# Patient Record
Sex: MALE | Race: BLACK OR AFRICAN AMERICAN | NOT HISPANIC OR LATINO | Employment: FULL TIME | URBAN - METROPOLITAN AREA
[De-identification: names, ages, dates, MRNs, and addresses within clinical notes are randomized per-mention and may not be internally consistent; named-entity substitution may affect disease eponyms.]

---

## 2022-12-16 ENCOUNTER — OFFICE VISIT (OUTPATIENT)
Dept: INTERNAL MEDICINE | Facility: CLINIC | Age: 32
End: 2022-12-16
Payer: COMMERCIAL

## 2022-12-16 VITALS
BODY MASS INDEX: 38.6 KG/M2 | DIASTOLIC BLOOD PRESSURE: 71 MMHG | TEMPERATURE: 97 F | RESPIRATION RATE: 18 BRPM | HEIGHT: 70 IN | WEIGHT: 269.63 LBS | HEART RATE: 92 BPM | OXYGEN SATURATION: 97 % | SYSTOLIC BLOOD PRESSURE: 121 MMHG

## 2022-12-16 DIAGNOSIS — J68.3 REACTIVE AIRWAYS DYSFUNCTION SYNDROME: ICD-10-CM

## 2022-12-16 DIAGNOSIS — E66.09 CLASS 2 OBESITY DUE TO EXCESS CALORIES WITH BODY MASS INDEX (BMI) OF 38.0 TO 38.9 IN ADULT, UNSPECIFIED WHETHER SERIOUS COMORBIDITY PRESENT: ICD-10-CM

## 2022-12-16 DIAGNOSIS — I10 HYPERTENSION, UNSPECIFIED TYPE: ICD-10-CM

## 2022-12-16 DIAGNOSIS — K21.9 GASTROESOPHAGEAL REFLUX DISEASE WITHOUT ESOPHAGITIS: Primary | ICD-10-CM

## 2022-12-16 PROCEDURE — 99214 OFFICE O/P EST MOD 30 MIN: CPT | Mod: S$GLB,,, | Performed by: NURSE PRACTITIONER

## 2022-12-16 PROCEDURE — 99214 PR OFFICE/OUTPT VISIT, EST, LEVL IV, 30-39 MIN: ICD-10-PCS | Mod: S$GLB,,, | Performed by: NURSE PRACTITIONER

## 2022-12-16 PROCEDURE — 3074F PR MOST RECENT SYSTOLIC BLOOD PRESSURE < 130 MM HG: ICD-10-PCS | Mod: CPTII,S$GLB,, | Performed by: NURSE PRACTITIONER

## 2022-12-16 PROCEDURE — 1160F PR REVIEW ALL MEDS BY PRESCRIBER/CLIN PHARMACIST DOCUMENTED: ICD-10-PCS | Mod: CPTII,S$GLB,, | Performed by: NURSE PRACTITIONER

## 2022-12-16 PROCEDURE — 4010F ACE/ARB THERAPY RXD/TAKEN: CPT | Mod: CPTII,S$GLB,, | Performed by: NURSE PRACTITIONER

## 2022-12-16 PROCEDURE — 1160F RVW MEDS BY RX/DR IN RCRD: CPT | Mod: CPTII,S$GLB,, | Performed by: NURSE PRACTITIONER

## 2022-12-16 PROCEDURE — 99999 PR PBB SHADOW E&M-NEW PATIENT-LVL IV: CPT | Mod: PBBFAC,,, | Performed by: NURSE PRACTITIONER

## 2022-12-16 PROCEDURE — 1159F MED LIST DOCD IN RCRD: CPT | Mod: CPTII,S$GLB,, | Performed by: NURSE PRACTITIONER

## 2022-12-16 PROCEDURE — 1159F PR MEDICATION LIST DOCUMENTED IN MEDICAL RECORD: ICD-10-PCS | Mod: CPTII,S$GLB,, | Performed by: NURSE PRACTITIONER

## 2022-12-16 PROCEDURE — 4010F PR ACE/ARB THEARPY RXD/TAKEN: ICD-10-PCS | Mod: CPTII,S$GLB,, | Performed by: NURSE PRACTITIONER

## 2022-12-16 PROCEDURE — 99999 PR PBB SHADOW E&M-NEW PATIENT-LVL IV: ICD-10-PCS | Mod: PBBFAC,,, | Performed by: NURSE PRACTITIONER

## 2022-12-16 PROCEDURE — 3078F DIAST BP <80 MM HG: CPT | Mod: CPTII,S$GLB,, | Performed by: NURSE PRACTITIONER

## 2022-12-16 PROCEDURE — 3074F SYST BP LT 130 MM HG: CPT | Mod: CPTII,S$GLB,, | Performed by: NURSE PRACTITIONER

## 2022-12-16 PROCEDURE — 3008F BODY MASS INDEX DOCD: CPT | Mod: CPTII,S$GLB,, | Performed by: NURSE PRACTITIONER

## 2022-12-16 PROCEDURE — 3008F PR BODY MASS INDEX (BMI) DOCUMENTED: ICD-10-PCS | Mod: CPTII,S$GLB,, | Performed by: NURSE PRACTITIONER

## 2022-12-16 PROCEDURE — 3078F PR MOST RECENT DIASTOLIC BLOOD PRESSURE < 80 MM HG: ICD-10-PCS | Mod: CPTII,S$GLB,, | Performed by: NURSE PRACTITIONER

## 2022-12-16 RX ORDER — BENZONATATE 100 MG/1
100 CAPSULE ORAL 3 TIMES DAILY PRN
Qty: 60 CAPSULE | Refills: 0 | Status: SHIPPED | OUTPATIENT
Start: 2022-12-16 | End: 2022-12-26

## 2022-12-16 RX ORDER — OMEPRAZOLE 20 MG/1
40 CAPSULE, DELAYED RELEASE ORAL DAILY
Qty: 90 CAPSULE | Refills: 0 | Status: SHIPPED | OUTPATIENT
Start: 2022-12-16 | End: 2023-12-16

## 2022-12-16 RX ORDER — AMLODIPINE BESYLATE 5 MG/1
TABLET ORAL
COMMUNITY
End: 2023-02-24 | Stop reason: SDUPTHER

## 2022-12-16 NOTE — PROGRESS NOTES
"  HPI     Chief Complaint  Chief Complaint   Patient presents with    Cough       HPI  Joon Terry is a 32 y.o. male with multiple medical diagnoses as listed in the medical history and problem list that presents for Persistent dry Cough. This patient is new to me.     Persistent Dry Cough x 2 month: Reports a URI towards the end of September 2022. Cough remains consistent throughout the day. Denies nightly coughing. Diet includes burgers, beef, chicken, pizza, pasta, water, green tea, sweet. He usually goes to bed an hour after eating. Does not take "prils" or "tans" for HTN. Takes Amlodipine daily. Vitals are within acceptable in clinic. No hx of Asthma, Bronchitis or autoimmunize disease.     History     PAST MEDICAL HISTORY:  Past Medical History:   Diagnosis Date    Hypertension        PAST SURGICAL HISTORY:  History reviewed. No pertinent surgical history.    SOCIAL HISTORY:  Social History     Socioeconomic History    Marital status:        FAMILY HISTORY:  Family History   Problem Relation Age of Onset    Hypertension Mother     Diabetes Father        ALLERGIES AND MEDICATIONS: updated and reviewed.  Review of patient's allergies indicates:  No Known Allergies  Current Outpatient Medications   Medication Sig Dispense Refill    amLODIPine (NORVASC) 5 MG tablet amlodipine Take tablet (oral) 1 time per day No date recorded tablet 1 time per day oral No set duration recorded No set duration amount recorded active 5 mg      (Magic mouthwash) 1:1:1 diphenhydrAMINE(Benadryl) 12.5mg/5ml liq, aluminum & magnesium hydroxide-simethicone (Maalox), LIDOcaine viscous 2% Swish and spit 5 mLs every 4 (four) hours as needed. for mouth sores 360 mL 0    benzonatate (TESSALON) 100 MG capsule Take 1 capsule (100 mg total) by mouth 3 (three) times daily as needed for Cough. 60 capsule 0    omeprazole (PRILOSEC) 20 MG capsule Take 2 capsules (40 mg total) by mouth once daily. 90 capsule 0     No current " "facility-administered medications for this visit.       Exam     ROS  Review of Systems   Constitutional:  Negative for appetite change, chills, fatigue and fever.   HENT:  Negative for congestion, ear pain, postnasal drip, rhinorrhea, sneezing, sore throat and tinnitus.    Respiratory:  Positive for cough. Negative for shortness of breath.    Cardiovascular:  Negative for chest pain and palpitations.   Gastrointestinal:  Negative for abdominal pain, constipation, diarrhea, nausea and vomiting.   Genitourinary:  Negative for difficulty urinating and dysuria.   Musculoskeletal:  Negative for arthralgias.   Neurological:  Negative for headaches.   Psychiatric/Behavioral:  Negative for sleep disturbance.          Physical Exam  Vitals:    12/16/22 0931   BP: 121/71   BP Location: Right arm   Patient Position: Sitting   BP Method: Large (Manual)   Pulse: 92   Resp: 18   Temp: 97.2 °F (36.2 °C)   TempSrc: Tympanic   SpO2: 97%   Weight: 122.3 kg (269 lb 10 oz)   Height: 5' 10" (1.778 m)    Body mass index is 38.69 kg/m².  Weight: 122.3 kg (269 lb 10 oz)   Height: 5' 10" (177.8 cm)   Physical Exam  Constitutional:       General: He is not in acute distress.     Appearance: He is well-developed.   HENT:      Head: Normocephalic and atraumatic.      Right Ear: External ear normal.      Left Ear: External ear normal.      Nose: Nose normal.      Mouth/Throat:      Pharynx: No oropharyngeal exudate.   Eyes:      Pupils: Pupils are equal, round, and reactive to light.   Cardiovascular:      Rate and Rhythm: Normal rate and regular rhythm.      Heart sounds: No murmur heard.    No friction rub. No gallop.   Pulmonary:      Effort: Pulmonary effort is normal. No respiratory distress.      Breath sounds: Normal breath sounds. No wheezing.   Abdominal:      General: Bowel sounds are normal.      Palpations: Abdomen is soft.   Musculoskeletal:      Cervical back: Neck supple.   Lymphadenopathy:      Cervical: No cervical adenopathy. "   Skin:     General: Skin is warm and dry.   Neurological:      Mental Status: He is alert and oriented to person, place, and time.   Psychiatric:         Mood and Affect: Mood normal.         Health Maintenance         Date Due Completion Date    Hepatitis C Screening Never done ---    Lipid Panel Never done ---    COVID-19 Vaccine (1) Never done ---    HIV Screening Never done ---    TETANUS VACCINE Never done ---    Influenza Vaccine (1) Never done ---            Assessment & Plan     Assessment & Plan  Problem List Items Addressed This Visit    None  Visit Diagnoses       Gastroesophageal reflux disease without esophagitis    -  Primary  -We discussed effective administration of Prilosec, adverse effects and side effects with education given for reinforcement  - Advised to gargle 5-10 ml of Mag mouthwash every 4-6 hours as needed   -Patient advised not to eat or drink at least 2-3 hours before bed. Decrease spicy and fatty meals. Increase watre intake. Use NSAIDS sparingly. Sleep with head elevated on pillows (Do not sleep flat)    Relevant Medications    omeprazole (PRILOSEC) 20 MG capsule    (Magic mouthwash) 1:1:1 diphenhydrAMINE(Benadryl) 12.5mg/5ml liq, aluminum & magnesium hydroxide-simethicone (Maalox), LIDOcaine viscous 2%    Reactive airways dysfunction syndrome      -We discussed effective administration of Tessalon Perles, adverse effects and side effects with education given for reinforcement    Relevant Medications    benzonatate (TESSALON) 100 MG capsule    Hypertension, unspecified type      -The current medical regimen is effective;  continue present plan and medications.   -Labs for scheduled routine physical exam    Relevant Orders    CBC Auto Differential    Comprehensive Metabolic Panel    TSH    Urinalysis    Class 2 obesity due to excess calories with body mass index (BMI) of 38.0 to 38.9 in adult, unspecified whether serious comorbidity present      -Diet and exercise discussed with  "patient.   -We discussed a "heart-healthy" diet with lots of fruits and vegetables, fiber, and healthy fats (like those found in fish and certain oils). Limit sugar, red meats, unhealthy fats and late night snacking.    Relevant Orders    Lipid Panel    Hemoglobin A1C              Health Maintenance reviewed: Deferred per patient    Follow-up: 1 month for Routine physical exam    30+ minutes of total time spent on the encounter, which includes face to face time and non-face to face time preparing to see the patient (eg, review of tests), Obtaining and/or reviewing separately obtained history, documenting clinical information in the electronic or other health record, independently interpreting results (not separately reported) and communicating results to the patient/family/caregiver, or Care coordination (not separately reported).    "

## 2023-01-18 ENCOUNTER — LAB VISIT (OUTPATIENT)
Dept: LAB | Facility: HOSPITAL | Age: 33
End: 2023-01-18
Attending: NURSE PRACTITIONER
Payer: COMMERCIAL

## 2023-01-18 DIAGNOSIS — I10 HYPERTENSION, UNSPECIFIED TYPE: ICD-10-CM

## 2023-01-18 DIAGNOSIS — E66.09 CLASS 2 OBESITY DUE TO EXCESS CALORIES WITH BODY MASS INDEX (BMI) OF 38.0 TO 38.9 IN ADULT, UNSPECIFIED WHETHER SERIOUS COMORBIDITY PRESENT: ICD-10-CM

## 2023-01-18 LAB
ALBUMIN SERPL BCP-MCNC: 3.7 G/DL (ref 3.5–5.2)
ALP SERPL-CCNC: 63 U/L (ref 55–135)
ALT SERPL W/O P-5'-P-CCNC: 29 U/L (ref 10–44)
ANION GAP SERPL CALC-SCNC: 11 MMOL/L (ref 8–16)
AST SERPL-CCNC: 43 U/L (ref 10–40)
BASOPHILS # BLD AUTO: 0.01 K/UL (ref 0–0.2)
BASOPHILS NFR BLD: 0.2 % (ref 0–1.9)
BILIRUB SERPL-MCNC: 0.4 MG/DL (ref 0.1–1)
BUN SERPL-MCNC: 10 MG/DL (ref 6–20)
CALCIUM SERPL-MCNC: 9.3 MG/DL (ref 8.7–10.5)
CHLORIDE SERPL-SCNC: 105 MMOL/L (ref 95–110)
CHOLEST SERPL-MCNC: 137 MG/DL (ref 120–199)
CHOLEST/HDLC SERPL: 5.7 {RATIO} (ref 2–5)
CO2 SERPL-SCNC: 23 MMOL/L (ref 23–29)
CREAT SERPL-MCNC: 0.9 MG/DL (ref 0.5–1.4)
DIFFERENTIAL METHOD: ABNORMAL
EOSINOPHIL # BLD AUTO: 0 K/UL (ref 0–0.5)
EOSINOPHIL NFR BLD: 0 % (ref 0–8)
ERYTHROCYTE [DISTWIDTH] IN BLOOD BY AUTOMATED COUNT: 14.5 % (ref 11.5–14.5)
EST. GFR  (NO RACE VARIABLE): >60 ML/MIN/1.73 M^2
ESTIMATED AVG GLUCOSE: 111 MG/DL (ref 68–131)
GLUCOSE SERPL-MCNC: 102 MG/DL (ref 70–110)
HBA1C MFR BLD: 5.5 % (ref 4–5.6)
HCT VFR BLD AUTO: 37 % (ref 40–54)
HDLC SERPL-MCNC: 24 MG/DL (ref 40–75)
HDLC SERPL: 17.5 % (ref 20–50)
HGB BLD-MCNC: 11.8 G/DL (ref 14–18)
IMM GRANULOCYTES # BLD AUTO: 0.04 K/UL (ref 0–0.04)
IMM GRANULOCYTES NFR BLD AUTO: 0.9 % (ref 0–0.5)
LDLC SERPL CALC-MCNC: 80 MG/DL (ref 63–159)
LYMPHOCYTES # BLD AUTO: 1.1 K/UL (ref 1–4.8)
LYMPHOCYTES NFR BLD: 23.5 % (ref 18–48)
MCH RBC QN AUTO: 28.2 PG (ref 27–31)
MCHC RBC AUTO-ENTMCNC: 31.9 G/DL (ref 32–36)
MCV RBC AUTO: 89 FL (ref 82–98)
MONOCYTES # BLD AUTO: 0.4 K/UL (ref 0.3–1)
MONOCYTES NFR BLD: 8.3 % (ref 4–15)
NEUTROPHILS # BLD AUTO: 3.1 K/UL (ref 1.8–7.7)
NEUTROPHILS NFR BLD: 67.1 % (ref 38–73)
NONHDLC SERPL-MCNC: 113 MG/DL
NRBC BLD-RTO: 0 /100 WBC
PLATELET # BLD AUTO: 150 K/UL (ref 150–450)
PMV BLD AUTO: 11.8 FL (ref 9.2–12.9)
POTASSIUM SERPL-SCNC: 3.5 MMOL/L (ref 3.5–5.1)
PROT SERPL-MCNC: 7.6 G/DL (ref 6–8.4)
RBC # BLD AUTO: 4.18 M/UL (ref 4.6–6.2)
SODIUM SERPL-SCNC: 139 MMOL/L (ref 136–145)
TRIGL SERPL-MCNC: 165 MG/DL (ref 30–150)
TSH SERPL DL<=0.005 MIU/L-ACNC: 1.59 UIU/ML (ref 0.4–4)
WBC # BLD AUTO: 4.68 K/UL (ref 3.9–12.7)

## 2023-01-18 PROCEDURE — 80053 COMPREHEN METABOLIC PANEL: CPT | Performed by: NURSE PRACTITIONER

## 2023-01-18 PROCEDURE — 84443 ASSAY THYROID STIM HORMONE: CPT | Performed by: NURSE PRACTITIONER

## 2023-01-18 PROCEDURE — 36415 COLL VENOUS BLD VENIPUNCTURE: CPT | Performed by: NURSE PRACTITIONER

## 2023-01-18 PROCEDURE — 83036 HEMOGLOBIN GLYCOSYLATED A1C: CPT | Performed by: NURSE PRACTITIONER

## 2023-01-18 PROCEDURE — 85025 COMPLETE CBC W/AUTO DIFF WBC: CPT | Performed by: NURSE PRACTITIONER

## 2023-01-18 PROCEDURE — 80061 LIPID PANEL: CPT | Performed by: NURSE PRACTITIONER

## 2023-01-27 ENCOUNTER — OFFICE VISIT (OUTPATIENT)
Dept: INTERNAL MEDICINE | Facility: CLINIC | Age: 33
End: 2023-01-27
Payer: COMMERCIAL

## 2023-01-27 VITALS
SYSTOLIC BLOOD PRESSURE: 132 MMHG | DIASTOLIC BLOOD PRESSURE: 78 MMHG | RESPIRATION RATE: 16 BRPM | HEIGHT: 70 IN | HEART RATE: 98 BPM | OXYGEN SATURATION: 97 % | BODY MASS INDEX: 39.04 KG/M2 | WEIGHT: 272.69 LBS | TEMPERATURE: 100 F

## 2023-01-27 DIAGNOSIS — E66.09 CLASS 2 OBESITY DUE TO EXCESS CALORIES WITH BODY MASS INDEX (BMI) OF 39.0 TO 39.9 IN ADULT, UNSPECIFIED WHETHER SERIOUS COMORBIDITY PRESENT: ICD-10-CM

## 2023-01-27 DIAGNOSIS — Z00.00 ROUTINE PHYSICAL EXAMINATION: Primary | ICD-10-CM

## 2023-01-27 DIAGNOSIS — E78.5 DYSLIPIDEMIA: ICD-10-CM

## 2023-01-27 DIAGNOSIS — J39.2 THROAT IRRITATION: ICD-10-CM

## 2023-01-27 DIAGNOSIS — J30.9 ALLERGIC RHINITIS, UNSPECIFIED SEASONALITY, UNSPECIFIED TRIGGER: ICD-10-CM

## 2023-01-27 PROBLEM — E66.812 CLASS 2 OBESITY DUE TO EXCESS CALORIES WITH BODY MASS INDEX (BMI) OF 39.0 TO 39.9 IN ADULT: Status: ACTIVE | Noted: 2023-01-27

## 2023-01-27 PROCEDURE — 3078F DIAST BP <80 MM HG: CPT | Mod: CPTII,S$GLB,, | Performed by: NURSE PRACTITIONER

## 2023-01-27 PROCEDURE — 3044F PR MOST RECENT HEMOGLOBIN A1C LEVEL <7.0%: ICD-10-PCS | Mod: CPTII,S$GLB,, | Performed by: NURSE PRACTITIONER

## 2023-01-27 PROCEDURE — 3075F PR MOST RECENT SYSTOLIC BLOOD PRESS GE 130-139MM HG: ICD-10-PCS | Mod: CPTII,S$GLB,, | Performed by: NURSE PRACTITIONER

## 2023-01-27 PROCEDURE — 3044F HG A1C LEVEL LT 7.0%: CPT | Mod: CPTII,S$GLB,, | Performed by: NURSE PRACTITIONER

## 2023-01-27 PROCEDURE — 1159F PR MEDICATION LIST DOCUMENTED IN MEDICAL RECORD: ICD-10-PCS | Mod: CPTII,S$GLB,, | Performed by: NURSE PRACTITIONER

## 2023-01-27 PROCEDURE — 1159F MED LIST DOCD IN RCRD: CPT | Mod: CPTII,S$GLB,, | Performed by: NURSE PRACTITIONER

## 2023-01-27 PROCEDURE — 3078F PR MOST RECENT DIASTOLIC BLOOD PRESSURE < 80 MM HG: ICD-10-PCS | Mod: CPTII,S$GLB,, | Performed by: NURSE PRACTITIONER

## 2023-01-27 PROCEDURE — 3008F BODY MASS INDEX DOCD: CPT | Mod: CPTII,S$GLB,, | Performed by: NURSE PRACTITIONER

## 2023-01-27 PROCEDURE — 99214 OFFICE O/P EST MOD 30 MIN: CPT | Mod: S$GLB,,, | Performed by: NURSE PRACTITIONER

## 2023-01-27 PROCEDURE — 99999 PR PBB SHADOW E&M-EST. PATIENT-LVL IV: ICD-10-PCS | Mod: PBBFAC,,, | Performed by: NURSE PRACTITIONER

## 2023-01-27 PROCEDURE — 99999 PR PBB SHADOW E&M-EST. PATIENT-LVL IV: CPT | Mod: PBBFAC,,, | Performed by: NURSE PRACTITIONER

## 2023-01-27 PROCEDURE — 3008F PR BODY MASS INDEX (BMI) DOCUMENTED: ICD-10-PCS | Mod: CPTII,S$GLB,, | Performed by: NURSE PRACTITIONER

## 2023-01-27 PROCEDURE — 3075F SYST BP GE 130 - 139MM HG: CPT | Mod: CPTII,S$GLB,, | Performed by: NURSE PRACTITIONER

## 2023-01-27 PROCEDURE — 1160F RVW MEDS BY RX/DR IN RCRD: CPT | Mod: CPTII,S$GLB,, | Performed by: NURSE PRACTITIONER

## 2023-01-27 PROCEDURE — 1160F PR REVIEW ALL MEDS BY PRESCRIBER/CLIN PHARMACIST DOCUMENTED: ICD-10-PCS | Mod: CPTII,S$GLB,, | Performed by: NURSE PRACTITIONER

## 2023-01-27 PROCEDURE — 99214 PR OFFICE/OUTPT VISIT, EST, LEVL IV, 30-39 MIN: ICD-10-PCS | Mod: S$GLB,,, | Performed by: NURSE PRACTITIONER

## 2023-01-27 RX ORDER — AZELASTINE 1 MG/ML
1 SPRAY, METERED NASAL 2 TIMES DAILY
Qty: 90 ML | Refills: 0 | Status: SHIPPED | OUTPATIENT
Start: 2023-01-27 | End: 2024-01-27

## 2023-01-27 NOTE — PROGRESS NOTES
HPI     Chief Complaint  No chief complaint on file.      HPI  Joon Terry is a 32 y.o. male with multiple medical diagnoses as listed in the medical history and problem list that presents for Routine Physical Exam. Pt is known to me with his last appointment 12/16/2022.      Routine Physical Exam: Reviewed labs with patient, discussed diet modifications and concerns. Treated at last visit for GERD. Reports throat irritation remains with increased mucous production. Denies dysphagia, hemoptysis, throat swelling or difficulty breathing. Vitals are within acceptable limits today.     History     PAST MEDICAL HISTORY:  Past Medical History:   Diagnosis Date    Hypertension        PAST SURGICAL HISTORY:  History reviewed. No pertinent surgical history.    SOCIAL HISTORY:  Social History     Socioeconomic History    Marital status:        FAMILY HISTORY:  Family History   Problem Relation Age of Onset    Hypertension Mother     Diabetes Father        ALLERGIES AND MEDICATIONS: updated and reviewed.  Review of patient's allergies indicates:  No Known Allergies  Current Outpatient Medications   Medication Sig Dispense Refill    (Magic mouthwash) 1:1:1 diphenhydrAMINE(Benadryl) 12.5mg/5ml liq, aluminum & magnesium hydroxide-simethicone (Maalox), LIDOcaine viscous 2% Swish and spit 5 mLs every 4 (four) hours as needed. for mouth sores 360 mL 0    amLODIPine (NORVASC) 5 MG tablet amlodipine Take tablet (oral) 1 time per day No date recorded tablet 1 time per day oral No set duration recorded No set duration amount recorded active 5 mg      omeprazole (PRILOSEC) 20 MG capsule Take 2 capsules (40 mg total) by mouth once daily. 90 capsule 0    azelastine (ASTELIN) 137 mcg (0.1 %) nasal spray 1 spray (137 mcg total) by Nasal route 2 (two) times daily. 90 mL 0     No current facility-administered medications for this visit.       Exam     ROS  Review of Systems   Constitutional:  Negative for appetite change, chills,  "fatigue and fever.   HENT:  Positive for sore throat. Negative for congestion, ear pain, postnasal drip, rhinorrhea, sneezing and tinnitus.    Respiratory:  Negative for cough and shortness of breath.    Cardiovascular:  Negative for chest pain and palpitations.   Gastrointestinal:  Negative for abdominal pain, constipation, diarrhea, nausea and vomiting.   Genitourinary:  Negative for difficulty urinating and dysuria.   Musculoskeletal:  Negative for arthralgias.   Neurological:  Negative for headaches.   Psychiatric/Behavioral:  Negative for sleep disturbance.          Physical Exam  Vitals:    01/27/23 1341   BP: 132/78   BP Location: Left arm   Patient Position: Sitting   BP Method: Large (Manual)   Pulse: 98   Resp: 16   Temp: 100 °F (37.8 °C)   TempSrc: Tympanic   SpO2: 97%   Weight: 123.7 kg (272 lb 11.3 oz)   Height: 5' 10" (1.778 m)    Body mass index is 39.13 kg/m².  Weight: 123.7 kg (272 lb 11.3 oz)   Height: 5' 10" (177.8 cm)   Physical Exam  Constitutional:       General: He is not in acute distress.     Appearance: Normal appearance. He is well-developed. He is obese.   HENT:      Head: Normocephalic and atraumatic.      Right Ear: External ear normal.      Left Ear: External ear normal.      Nose: Nose normal.      Mouth/Throat:      Pharynx: No oropharyngeal exudate.   Eyes:      Pupils: Pupils are equal, round, and reactive to light.   Cardiovascular:      Rate and Rhythm: Normal rate and regular rhythm.      Pulses: Normal pulses.      Heart sounds: No murmur heard.    No friction rub. No gallop.   Pulmonary:      Effort: Pulmonary effort is normal. No respiratory distress.      Breath sounds: Normal breath sounds. No wheezing.   Abdominal:      General: Bowel sounds are normal.      Palpations: Abdomen is soft.   Musculoskeletal:      Cervical back: Neck supple.   Lymphadenopathy:      Cervical: No cervical adenopathy.   Skin:     General: Skin is warm and dry.      Capillary Refill: Capillary " refill takes less than 2 seconds.   Neurological:      General: No focal deficit present.      Mental Status: He is alert and oriented to person, place, and time. Mental status is at baseline.   Psychiatric:         Mood and Affect: Mood normal.         Health Maintenance         Date Due Completion Date    Hepatitis C Screening Never done ---    COVID-19 Vaccine (1) Never done ---    HIV Screening Never done ---    TETANUS VACCINE Never done ---    Influenza Vaccine (1) Never done ---            Assessment & Plan     Assessment & Plan  Problem List Items Addressed This Visit          ENT    Allergic rhinitis  -We discussed effective administration of Astelin, adverse effects and side effects with education given for reinforcement    Relevant Medications    azelastine (ASTELIN) 137 mcg (0.1 %) nasal spray       Cardiac/Vascular    Dyslipidemia   -Diet controlled at this time  -May benefit from statin therapy if no change in approx 5 months       Endocrine    Class 2 obesity due to excess calories with body mass index (BMI) of 39.0 to 39.9 in adult  -Diet and exercise discussed with patient.      Other Visit Diagnoses       Throat irritation        Relevant Orders    Ambulatory referral/consult to ENT    Routine physical examination    - Primary  -Counseled on age appropriate medical preventative services including age appropriate cancer screenings, age appropriate eye and dental exams, over all nutritional health, need for a consistent exercise regimen, and an over all push towards maintaining a vigorous and active lifestyle.  Counseled on age appropriate vaccines and discussed upcoming health care needs based on age/gender. Discussed good sleep hygiene and stress management.              Health Maintenance reviewed: Deferred per patient     Follow-up: 5 months to Establish Care with Dr. Andujar    30+ minutes of total time spent on the encounter, which includes face to face time and non-face to face time preparing  to see the patient (eg, review of tests), Obtaining and/or reviewing separately obtained history, documenting clinical information in the electronic or other health record, independently interpreting results (not separately reported) and communicating results to the patient/family/caregiver, or Care coordination (not separately reported).

## 2023-01-30 ENCOUNTER — HOSPITAL ENCOUNTER (OUTPATIENT)
Dept: RADIOLOGY | Facility: HOSPITAL | Age: 33
Discharge: HOME | End: 2023-01-30
Attending: OTOLARYNGOLOGY
Payer: COMMERCIAL

## 2023-01-30 ENCOUNTER — OFFICE VISIT (OUTPATIENT)
Dept: OTOLARYNGOLOGY | Facility: CLINIC | Age: 33
End: 2023-01-30
Payer: COMMERCIAL

## 2023-01-30 ENCOUNTER — TELEPHONE (OUTPATIENT)
Dept: OTOLARYNGOLOGY | Facility: CLINIC | Age: 33
End: 2023-01-30

## 2023-01-30 ENCOUNTER — PATIENT MESSAGE (OUTPATIENT)
Dept: PULMONOLOGY | Facility: CLINIC | Age: 33
End: 2023-01-30
Payer: COMMERCIAL

## 2023-01-30 VITALS
HEART RATE: 96 BPM | BODY MASS INDEX: 39.04 KG/M2 | HEIGHT: 70 IN | TEMPERATURE: 98 F | DIASTOLIC BLOOD PRESSURE: 105 MMHG | WEIGHT: 272.69 LBS | SYSTOLIC BLOOD PRESSURE: 151 MMHG

## 2023-01-30 DIAGNOSIS — R05.9 COUGH, UNSPECIFIED TYPE: ICD-10-CM

## 2023-01-30 DIAGNOSIS — R05.9 COUGH, UNSPECIFIED TYPE: Primary | ICD-10-CM

## 2023-01-30 PROCEDURE — 1160F PR REVIEW ALL MEDS BY PRESCRIBER/CLIN PHARMACIST DOCUMENTED: ICD-10-PCS | Mod: CPTII,S$GLB,, | Performed by: OTOLARYNGOLOGY

## 2023-01-30 PROCEDURE — 70220 X-RAY EXAM OF SINUSES: CPT | Mod: TC

## 2023-01-30 PROCEDURE — 1160F RVW MEDS BY RX/DR IN RCRD: CPT | Mod: CPTII,S$GLB,, | Performed by: OTOLARYNGOLOGY

## 2023-01-30 PROCEDURE — 70220 X-RAY EXAM OF SINUSES: CPT | Mod: 26,,, | Performed by: RADIOLOGY

## 2023-01-30 PROCEDURE — 3044F PR MOST RECENT HEMOGLOBIN A1C LEVEL <7.0%: ICD-10-PCS | Mod: CPTII,S$GLB,, | Performed by: OTOLARYNGOLOGY

## 2023-01-30 PROCEDURE — 1159F MED LIST DOCD IN RCRD: CPT | Mod: CPTII,S$GLB,, | Performed by: OTOLARYNGOLOGY

## 2023-01-30 PROCEDURE — 99202 PR OFFICE/OUTPT VISIT, NEW, LEVL II, 15-29 MIN: ICD-10-PCS | Mod: S$GLB,,, | Performed by: OTOLARYNGOLOGY

## 2023-01-30 PROCEDURE — 1159F PR MEDICATION LIST DOCUMENTED IN MEDICAL RECORD: ICD-10-PCS | Mod: CPTII,S$GLB,, | Performed by: OTOLARYNGOLOGY

## 2023-01-30 PROCEDURE — 3008F PR BODY MASS INDEX (BMI) DOCUMENTED: ICD-10-PCS | Mod: CPTII,S$GLB,, | Performed by: OTOLARYNGOLOGY

## 2023-01-30 PROCEDURE — 99999 PR PBB SHADOW E&M-EST. PATIENT-LVL IV: CPT | Mod: PBBFAC,,, | Performed by: OTOLARYNGOLOGY

## 2023-01-30 PROCEDURE — 3077F PR MOST RECENT SYSTOLIC BLOOD PRESSURE >= 140 MM HG: ICD-10-PCS | Mod: CPTII,S$GLB,, | Performed by: OTOLARYNGOLOGY

## 2023-01-30 PROCEDURE — 99999 PR PBB SHADOW E&M-EST. PATIENT-LVL IV: ICD-10-PCS | Mod: PBBFAC,,, | Performed by: OTOLARYNGOLOGY

## 2023-01-30 PROCEDURE — 3080F PR MOST RECENT DIASTOLIC BLOOD PRESSURE >= 90 MM HG: ICD-10-PCS | Mod: CPTII,S$GLB,, | Performed by: OTOLARYNGOLOGY

## 2023-01-30 PROCEDURE — 99202 OFFICE O/P NEW SF 15 MIN: CPT | Mod: S$GLB,,, | Performed by: OTOLARYNGOLOGY

## 2023-01-30 PROCEDURE — 3008F BODY MASS INDEX DOCD: CPT | Mod: CPTII,S$GLB,, | Performed by: OTOLARYNGOLOGY

## 2023-01-30 PROCEDURE — 3080F DIAST BP >= 90 MM HG: CPT | Mod: CPTII,S$GLB,, | Performed by: OTOLARYNGOLOGY

## 2023-01-30 PROCEDURE — 3044F HG A1C LEVEL LT 7.0%: CPT | Mod: CPTII,S$GLB,, | Performed by: OTOLARYNGOLOGY

## 2023-01-30 PROCEDURE — 70220 XR SINUSES MIN 3 VIEWS: ICD-10-PCS | Mod: 26,,, | Performed by: RADIOLOGY

## 2023-01-30 PROCEDURE — 3077F SYST BP >= 140 MM HG: CPT | Mod: CPTII,S$GLB,, | Performed by: OTOLARYNGOLOGY

## 2023-01-30 NOTE — PROGRESS NOTES
SUBJECTIVE:    HPI: James is here today because he has had a persistent cough and mucous since having a URI in September. He denies throat pain, but he feels that his throat is irritated, which causes him to cough. He has tried prescription and OTC cough meds without relief. He does not think the cough is worse during a particular time of the day.He has been on Omeprazole, which has not helped. Tessalon perles have not helped. He has not seen Pulmonary. He has not had a chest xray. No aggravating or alleviating factors. He denies allergic symptoms. He has a good bit of post nasal drip but no facial pressure. He does have a prescription for Flonase but hasn't used it yet.         OBJECTIVE:  Physical Exam  Constitutional:       Appearance: Normal appearance.   HENT:      Right Ear: External ear normal.      Left Ear: External ear normal.      Nose: Nose normal. No mucosal edema or rhinorrhea.      Mouth/Throat:      Mouth: Mucous membranes are moist.      Pharynx: Oropharynx is clear.   Pulmonary:      Effort: Pulmonary effort is normal.   Musculoskeletal:      Cervical back: Normal range of motion.   Neurological:      Mental Status: He is alert and oriented to person, place, and time.   Psychiatric:         Mood and Affect: Mood normal.         Behavior: Behavior normal.         ASSESSMENT:    Encounter Diagnosis   Name Primary?    Cough, unspecified type          PLAN:       We discussed this could be a post inflammatory cough. It could also be related to occult sinusitis (allergic rhinitis and reflux less likely). Will proceed with sinus xray for further evaluation. He declined fiberoptic laryngoscopy today. If sinus xray is clear, will refer to Pulmonary for further evaluation.

## 2023-02-10 PROBLEM — R05.3 CHRONIC COUGH: Status: ACTIVE | Noted: 2023-02-10

## 2023-02-24 ENCOUNTER — HOSPITAL ENCOUNTER (OUTPATIENT)
Dept: RADIOLOGY | Facility: HOSPITAL | Age: 33
Discharge: HOME | End: 2023-02-24
Attending: NURSE PRACTITIONER
Payer: COMMERCIAL

## 2023-02-24 ENCOUNTER — OFFICE VISIT (OUTPATIENT)
Dept: INTERNAL MEDICINE | Facility: CLINIC | Age: 33
End: 2023-02-24
Payer: COMMERCIAL

## 2023-02-24 VITALS
BODY MASS INDEX: 39.01 KG/M2 | DIASTOLIC BLOOD PRESSURE: 100 MMHG | SYSTOLIC BLOOD PRESSURE: 128 MMHG | OXYGEN SATURATION: 98 % | WEIGHT: 272.5 LBS | TEMPERATURE: 102 F | HEIGHT: 70 IN | HEART RATE: 110 BPM

## 2023-02-24 DIAGNOSIS — R50.9 FEVER, UNSPECIFIED FEVER CAUSE: ICD-10-CM

## 2023-02-24 DIAGNOSIS — J40 BRONCHITIS: ICD-10-CM

## 2023-02-24 DIAGNOSIS — R50.9 FEVER, UNSPECIFIED FEVER CAUSE: Primary | ICD-10-CM

## 2023-02-24 DIAGNOSIS — R05.3 CHRONIC COUGH: ICD-10-CM

## 2023-02-24 PROBLEM — I10 HYPERTENSION: Status: ACTIVE | Noted: 2023-02-24

## 2023-02-24 LAB
CTP QC/QA: YES
CTP QC/QA: YES
POC MOLECULAR INFLUENZA A AGN: NEGATIVE
POC MOLECULAR INFLUENZA B AGN: NEGATIVE
SARS-COV-2 RDRP RESP QL NAA+PROBE: NEGATIVE

## 2023-02-24 PROCEDURE — 99999 PR PBB SHADOW E&M-EST. PATIENT-LVL IV: ICD-10-PCS | Mod: PBBFAC,,, | Performed by: NURSE PRACTITIONER

## 2023-02-24 PROCEDURE — 99214 OFFICE O/P EST MOD 30 MIN: CPT | Mod: S$GLB,,, | Performed by: NURSE PRACTITIONER

## 2023-02-24 PROCEDURE — 87635 PR SARS-COV-2 COVID-19 AMPLIFIED PROBE: ICD-10-PCS | Mod: QW,S$GLB,, | Performed by: NURSE PRACTITIONER

## 2023-02-24 PROCEDURE — 99214 PR OFFICE/OUTPT VISIT, EST, LEVL IV, 30-39 MIN: ICD-10-PCS | Mod: S$GLB,,, | Performed by: NURSE PRACTITIONER

## 2023-02-24 PROCEDURE — 71046 X-RAY EXAM CHEST 2 VIEWS: CPT | Mod: 26,,, | Performed by: STUDENT IN AN ORGANIZED HEALTH CARE EDUCATION/TRAINING PROGRAM

## 2023-02-24 PROCEDURE — 87635 SARS-COV-2 COVID-19 AMP PRB: CPT | Mod: PBBFAC | Performed by: NURSE PRACTITIONER

## 2023-02-24 PROCEDURE — 87502 INFLUENZA DNA AMP PROBE: CPT | Mod: QW,S$GLB,, | Performed by: NURSE PRACTITIONER

## 2023-02-24 PROCEDURE — 87635 SARS-COV-2 COVID-19 AMP PRB: CPT | Mod: QW,S$GLB,, | Performed by: NURSE PRACTITIONER

## 2023-02-24 PROCEDURE — 71046 XR CHEST PA AND LATERAL: ICD-10-PCS | Mod: 26,,, | Performed by: STUDENT IN AN ORGANIZED HEALTH CARE EDUCATION/TRAINING PROGRAM

## 2023-02-24 PROCEDURE — 87502 INFLUENZA DNA AMP PROBE: CPT | Mod: PBBFAC | Performed by: NURSE PRACTITIONER

## 2023-02-24 PROCEDURE — 71046 X-RAY EXAM CHEST 2 VIEWS: CPT | Mod: TC

## 2023-02-24 PROCEDURE — 87502 CHG INFECTIOUS AGENT DNA/RNA INFLUENZA FIRST 2 TYPES: ICD-10-PCS | Mod: QW,S$GLB,, | Performed by: NURSE PRACTITIONER

## 2023-02-24 PROCEDURE — 99214 OFFICE O/P EST MOD 30 MIN: CPT | Mod: PBBFAC,25 | Performed by: NURSE PRACTITIONER

## 2023-02-24 PROCEDURE — 99999 PR PBB SHADOW E&M-EST. PATIENT-LVL IV: CPT | Mod: PBBFAC,,, | Performed by: NURSE PRACTITIONER

## 2023-02-24 RX ORDER — AMLODIPINE BESYLATE 5 MG/1
TABLET ORAL
Qty: 90 TABLET | Refills: 1 | Status: SHIPPED | OUTPATIENT
Start: 2023-02-24

## 2023-02-24 RX ORDER — DOXYCYCLINE 100 MG/1
100 CAPSULE ORAL EVERY 12 HOURS
Qty: 20 CAPSULE | Refills: 0 | Status: SHIPPED | OUTPATIENT
Start: 2023-02-24 | End: 2023-03-10 | Stop reason: SDUPTHER

## 2023-02-24 NOTE — PROGRESS NOTES
Subjective:       Patient ID: Joon Terry is a 32 y.o. male.    Chief Complaint: Cough, Fever, mucus , and Nasal Congestion    Cough  Associated symptoms include a fever. Pertinent negatives include no chest pain, chills, ear pain, headaches, postnasal drip, rhinorrhea, sore throat, shortness of breath or wheezing.   Fever   Associated symptoms include congestion and coughing. Pertinent negatives include no abdominal pain, chest pain, diarrhea, ear pain, headaches, nausea, sore throat, urinary pain, vomiting or wheezing.     Pt here for above  Ongoing chronic cough x a few months. Saw ent, has nasal spray, PPI- not improved  Now with fever that began yesterday. No cp , sob  Neg covid and flu today    Htn- pt admits that he has been out of BP meds x 1 week. Asymptomatic        Past Medical History:   Diagnosis Date    Hypertension      History reviewed. No pertinent surgical history.  History reviewed. No pertinent surgical history.  Social History     Socioeconomic History    Marital status:    Tobacco Use    Smoking status: Never    Smokeless tobacco: Never     Review of patient's allergies indicates:  No Known Allergies  Current Outpatient Medications   Medication Sig    (Magic mouthwash) 1:1:1 diphenhydrAMINE(Benadryl) 12.5mg/5ml liq, aluminum & magnesium hydroxide-simethicone (Maalox), LIDOcaine viscous 2% Swish and spit 5 mLs every 4 (four) hours as needed. for mouth sores (Patient not taking: Reported on 2/24/2023)    amLODIPine (NORVASC) 5 MG tablet amlodipine Take tablet (oral) 1 time per day No date recorded tablet 1 time per day oral No set duration recorded No set duration amount recorded active 5 mg    azelastine (ASTELIN) 137 mcg (0.1 %) nasal spray 1 spray (137 mcg total) by Nasal route 2 (two) times daily. (Patient not taking: Reported on 2/24/2023)    doxycycline (MONODOX) 100 MG capsule Take 1 capsule (100 mg total) by mouth every 12 (twelve) hours. for 10 days    omeprazole (PRILOSEC)  20 MG capsule Take 2 capsules (40 mg total) by mouth once daily. (Patient not taking: Reported on 2/24/2023)     No current facility-administered medications for this visit.           Review of Systems   Constitutional:  Positive for fever. Negative for activity change, appetite change, chills, diaphoresis, fatigue and unexpected weight change.   HENT:  Positive for congestion. Negative for ear pain, postnasal drip, rhinorrhea, sinus pressure, sinus pain, sneezing, sore throat, tinnitus, trouble swallowing and voice change.    Eyes:  Negative for photophobia, pain and visual disturbance.   Respiratory:  Positive for cough. Negative for chest tightness, shortness of breath and wheezing.    Cardiovascular:  Negative for chest pain, palpitations and leg swelling.   Gastrointestinal:  Negative for abdominal distention, abdominal pain, constipation, diarrhea, nausea and vomiting.   Genitourinary:  Negative for decreased urine volume, difficulty urinating, dysuria, flank pain, frequency, hematuria and urgency.   Musculoskeletal:  Negative for arthralgias, back pain, joint swelling, neck pain and neck stiffness.   Allergic/Immunologic: Negative for immunocompromised state.   Neurological:  Negative for dizziness, tremors, seizures, syncope, facial asymmetry, speech difficulty, weakness, light-headedness, numbness and headaches.   Hematological:  Negative for adenopathy. Does not bruise/bleed easily.   Psychiatric/Behavioral:  Negative for confusion and sleep disturbance.      Objective:      Physical Exam  Vitals reviewed.   Cardiovascular:      Rate and Rhythm: Normal rate and regular rhythm.      Heart sounds: Normal heart sounds.   Pulmonary:      Effort: Pulmonary effort is normal.      Breath sounds: Normal breath sounds.   Abdominal:      General: Bowel sounds are normal.      Palpations: Abdomen is soft.   Neurological:      Mental Status: He is alert and oriented to person, place, and time.   Psychiatric:          Mood and Affect: Mood normal.       Assessment:     Vitals:    02/24/23 1023   BP: (!) 128/100   Pulse: 110   Temp: (!) 101.7 °F (38.7 °C)         1. Fever, unspecified fever cause    2. Bronchitis    3. Chronic cough          Plan:   Fever, unspecified fever cause  -     POCT COVID-19 Rapid Screening; Future; Expected date: 02/24/2023  -     POCT Influenza A/B Molecular  -     X-Ray Chest PA And Lateral; Future; Expected date: 02/24/2023    Bronchitis  -     X-Ray Chest PA And Lateral; Future; Expected date: 02/24/2023    Chronic cough    Other orders  -     doxycycline (MONODOX) 100 MG capsule; Take 1 capsule (100 mg total) by mouth every 12 (twelve) hours. for 10 days  Dispense: 20 capsule; Refill: 0  -     amLODIPine (NORVASC) 5 MG tablet; amlodipine Take tablet (oral) 1 time per day No date recorded tablet 1 time per day oral No set duration recorded No set duration amount recorded active 5 mg  Dispense: 90 tablet; Refill: 1        As above  Cxr  Start doxy  Has pulm appt next week  Restart norvasc, bp diary  Rtc in 2 weeks to recheck bp and est care

## 2023-02-27 ENCOUNTER — OFFICE VISIT (OUTPATIENT)
Dept: PULMONOLOGY | Facility: CLINIC | Age: 33
End: 2023-02-27
Payer: COMMERCIAL

## 2023-02-27 VITALS
TEMPERATURE: 100 F | HEIGHT: 70 IN | SYSTOLIC BLOOD PRESSURE: 124 MMHG | WEIGHT: 270.94 LBS | HEART RATE: 98 BPM | RESPIRATION RATE: 17 BRPM | OXYGEN SATURATION: 99 % | BODY MASS INDEX: 38.79 KG/M2 | DIASTOLIC BLOOD PRESSURE: 82 MMHG

## 2023-02-27 DIAGNOSIS — J45.991 COUGH VARIANT ASTHMA: ICD-10-CM

## 2023-02-27 DIAGNOSIS — R05.3 CHRONIC COUGH: Primary | ICD-10-CM

## 2023-02-27 DIAGNOSIS — J30.89 SEASONAL ALLERGIC RHINITIS DUE TO OTHER ALLERGIC TRIGGER: ICD-10-CM

## 2023-02-27 DIAGNOSIS — J32.1 CHRONIC FRONTAL SINUSITIS: ICD-10-CM

## 2023-02-27 DIAGNOSIS — E66.09 CLASS 2 OBESITY DUE TO EXCESS CALORIES WITHOUT SERIOUS COMORBIDITY WITH BODY MASS INDEX (BMI) OF 39.0 TO 39.9 IN ADULT: ICD-10-CM

## 2023-02-27 PROBLEM — L50.9 URTICARIA, UNSPECIFIED: Status: ACTIVE | Noted: 2022-10-07

## 2023-02-27 PROCEDURE — 3044F HG A1C LEVEL LT 7.0%: CPT | Mod: CPTII,S$GLB,, | Performed by: INTERNAL MEDICINE

## 2023-02-27 PROCEDURE — 3044F PR MOST RECENT HEMOGLOBIN A1C LEVEL <7.0%: ICD-10-PCS | Mod: CPTII,S$GLB,, | Performed by: INTERNAL MEDICINE

## 2023-02-27 PROCEDURE — 1159F MED LIST DOCD IN RCRD: CPT | Mod: CPTII,S$GLB,, | Performed by: INTERNAL MEDICINE

## 2023-02-27 PROCEDURE — 99999 PR PBB SHADOW E&M-EST. PATIENT-LVL IV: ICD-10-PCS | Mod: PBBFAC,,, | Performed by: INTERNAL MEDICINE

## 2023-02-27 PROCEDURE — 99999 PR PBB SHADOW E&M-EST. PATIENT-LVL IV: CPT | Mod: PBBFAC,,, | Performed by: INTERNAL MEDICINE

## 2023-02-27 PROCEDURE — 3079F PR MOST RECENT DIASTOLIC BLOOD PRESSURE 80-89 MM HG: ICD-10-PCS | Mod: CPTII,S$GLB,, | Performed by: INTERNAL MEDICINE

## 2023-02-27 PROCEDURE — 3008F BODY MASS INDEX DOCD: CPT | Mod: CPTII,S$GLB,, | Performed by: INTERNAL MEDICINE

## 2023-02-27 PROCEDURE — 99205 OFFICE O/P NEW HI 60 MIN: CPT | Mod: S$GLB,,, | Performed by: INTERNAL MEDICINE

## 2023-02-27 PROCEDURE — 3074F PR MOST RECENT SYSTOLIC BLOOD PRESSURE < 130 MM HG: ICD-10-PCS | Mod: CPTII,S$GLB,, | Performed by: INTERNAL MEDICINE

## 2023-02-27 PROCEDURE — 3008F PR BODY MASS INDEX (BMI) DOCUMENTED: ICD-10-PCS | Mod: CPTII,S$GLB,, | Performed by: INTERNAL MEDICINE

## 2023-02-27 PROCEDURE — 3079F DIAST BP 80-89 MM HG: CPT | Mod: CPTII,S$GLB,, | Performed by: INTERNAL MEDICINE

## 2023-02-27 PROCEDURE — 3074F SYST BP LT 130 MM HG: CPT | Mod: CPTII,S$GLB,, | Performed by: INTERNAL MEDICINE

## 2023-02-27 PROCEDURE — 1159F PR MEDICATION LIST DOCUMENTED IN MEDICAL RECORD: ICD-10-PCS | Mod: CPTII,S$GLB,, | Performed by: INTERNAL MEDICINE

## 2023-02-27 PROCEDURE — 99205 PR OFFICE/OUTPT VISIT, NEW, LEVL V, 60-74 MIN: ICD-10-PCS | Mod: S$GLB,,, | Performed by: INTERNAL MEDICINE

## 2023-02-27 RX ORDER — FLUTICASONE PROPIONATE 50 MCG
2 SPRAY, SUSPENSION (ML) NASAL DAILY
Qty: 16 G | Refills: 11 | Status: SHIPPED | OUTPATIENT
Start: 2023-02-27

## 2023-02-27 RX ORDER — FLUTICASONE PROPIONATE AND SALMETEROL 250; 50 UG/1; UG/1
1 POWDER RESPIRATORY (INHALATION) 2 TIMES DAILY
Qty: 1 EACH | Refills: 11 | Status: SHIPPED | OUTPATIENT
Start: 2023-02-27 | End: 2024-02-27

## 2023-02-27 RX ORDER — PREDNISONE 20 MG/1
TABLET ORAL
Qty: 20 TABLET | Refills: 0 | Status: SHIPPED | OUTPATIENT
Start: 2023-02-27

## 2023-02-27 NOTE — PROGRESS NOTES
Subjective:     Patient ID: Joon Terry is a 32 y.o. male.    Chief Complaint:  Cough for 6 months    HPI 32-year-old gentleman who complains of a chronic cough for the past 6 months.  Patient reports that he has been given medications for cough in the past without resolution of his symptoms.  He denies any significant past history of asthma or allergies but reports he may have had some allergy testing in the past be cannot recall details.  Recently he reports of fever for the past 3 days associated with coughing and sinus congestion.  He denies any prior history of asthma.  He is lifelong nonsmoker of cigarettes.    Fever FOR 3 DAYS - started on doxycycline 3 days ago.  Ongoing chronic cough x six months. Saw ENT- prescription for azelastine (ASTELIN) 137 mcg (0.1 %) nasal spray - not using    Cough:  Patient complains of fever, nasal congestion, nonproductive cough, productive cough, and productive cough with sputum described as yellow. Symptoms began 6 months ago. Symptoms have been gradually improving since that time.The cough is nocturnal, paroxysmal, and productive and is aggravated by nothing. Associated symptoms include: postnasal drip, shortness of breath, and sputum production. Patient does not have new pets. Patient does not have a history of asthma. Patient does not have a history of environmental allergens. Patient has not traveled recently. Patient does not have a history of smoking. Patient has had a previous chest x-ray. Patient has not had a PPD done.    Patient denies prior history of COVID 19   Not immunized      Past Medical History:   Diagnosis Date    Hypertension      No past surgical history on file.  Review of patient's allergies indicates:  No Known Allergies  Current Outpatient Medications on File Prior to Visit   Medication Sig Dispense Refill    amLODIPine (NORVASC) 5 MG tablet amlodipine Take tablet (oral) 1 time per day No date recorded tablet 1 time per day oral No set duration  recorded No set duration amount recorded active 5 mg 90 tablet 1    doxycycline (MONODOX) 100 MG capsule Take 1 capsule (100 mg total) by mouth every 12 (twelve) hours. for 10 days 20 capsule 0    (Magic mouthwash) 1:1:1 diphenhydrAMINE(Benadryl) 12.5mg/5ml liq, aluminum & magnesium hydroxide-simethicone (Maalox), LIDOcaine viscous 2% Swish and spit 5 mLs every 4 (four) hours as needed. for mouth sores (Patient not taking: Reported on 2/24/2023) 360 mL 0    azelastine (ASTELIN) 137 mcg (0.1 %) nasal spray 1 spray (137 mcg total) by Nasal route 2 (two) times daily. (Patient not taking: Reported on 2/24/2023) 90 mL 0    omeprazole (PRILOSEC) 20 MG capsule Take 2 capsules (40 mg total) by mouth once daily. (Patient not taking: Reported on 2/24/2023) 90 capsule 0     No current facility-administered medications on file prior to visit.     Social History     Socioeconomic History    Marital status:    Tobacco Use    Smoking status: Never    Smokeless tobacco: Never     Family History   Problem Relation Age of Onset    Hypertension Mother     Diabetes Father        Review of Systems   Constitutional:  Positive for fatigue. Negative for fever.   HENT:  Positive for postnasal drip, rhinorrhea and congestion.    Eyes:  Negative for redness and itching.   Respiratory:  Positive for cough, sputum production and dyspnea on extertion. Negative for shortness of breath, use of rescue inhaler and Paroxysmal Nocturnal Dyspnea.    Cardiovascular:  Negative for chest pain, palpitations and leg swelling.   Genitourinary:  Negative for difficulty urinating and hematuria.   Endocrine:  Negative for cold intolerance and heat intolerance.    Skin:  Negative for rash.   Gastrointestinal:  Negative for nausea and abdominal pain.   Neurological:  Negative for dizziness, syncope, weakness and light-headedness.   Hematological:  Negative for adenopathy. Does not bruise/bleed easily.   Psychiatric/Behavioral:  Negative for sleep  "disturbance. The patient is not nervous/anxious.      Objective:      /82   Pulse 98   Temp 99.9 °F (37.7 °C)   Resp 17   Ht 5' 10" (1.778 m)   Wt 122.9 kg (270 lb 15.1 oz)   SpO2 99%   BMI 38.88 kg/m²   Physical Exam  Vitals and nursing note reviewed.   Constitutional:       Appearance: He is well-developed. He is obese.   HENT:      Head: Normocephalic and atraumatic.      Nose: Congestion and rhinorrhea present.   Eyes:      Conjunctiva/sclera: Conjunctivae normal.      Pupils: Pupils are equal, round, and reactive to light.   Neck:      Thyroid: No thyromegaly.      Vascular: No JVD.      Trachea: No tracheal deviation.   Cardiovascular:      Rate and Rhythm: Normal rate and regular rhythm.      Heart sounds: Normal heart sounds.   Pulmonary:      Effort: Pulmonary effort is normal.      Breath sounds: Normal breath sounds.   Abdominal:      Palpations: Abdomen is soft.   Musculoskeletal:         General: Normal range of motion.      Cervical back: Neck supple.   Lymphadenopathy:      Cervical: No cervical adenopathy.   Skin:     General: Skin is warm and dry.   Neurological:      Mental Status: He is alert and oriented to person, place, and time.   Psychiatric:         Mood and Affect: Mood normal.         Behavior: Behavior normal.     Personal Diagnostic Review  Chest x-ray: Bilateral upper lobe infiltrates on CXR - my review - see radiologist report below    Pulmonary Studies Review 2/27/2023   SpO2 99   Height 70   Weight 4335.13   BMI (Calculated) 38.9   Predicted Distance 546.69   Predicted Distance Meters (Calculated) 660       X-Ray Chest PA And Lateral  Narrative: EXAM: XR CHEST PA AND LATERAL    CLINICAL HISTORY:  [R50.9]-Fever, unspecified./[J40]-Bronchitis, not specified as acute or chronic.    TECHNIQUE: PA and lateral views of the chest.    COMPARISON: None available.    FINDINGS:   Cardiomediastinal silhouette is within normal limits. Trachea is midline. Pulmonary vasculature is " normal. Lungs are clear. No significant pleural effusion or pneumothorax. No acute bony abnormality.  Impression: No acute cardiopulmonary abnormality.    Finalized on: 2/24/2023 3:32 PM By:  Alfredo Jalloh MD  R# [id number]      2023-02-24 15:35:[id number]    [facility]      Office Spirometry Results:NA     No flowsheet data found.  Pulmonary Studies Review 2/27/2023   SpO2 99   Height 70   Weight 4335.13   BMI (Calculated) 38.9   Predicted Distance 546.69   Predicted Distance Meters (Calculated) 660         Assessment:       Class 2 obesity due to excess calories with body mass index (BMI) of 39.0 to 39.9 in adult  Last documentation =       BMI noted to be elevated. Changes in weight over time reviewed in chart (if available) and by patient recollection. Patient advised and counseled concerning the adverse medical consequences of obesity. Treatment options discussed - calorie restriction, increasing calorie expenditure, medical and surgical options noted.  The patient's severe obesity was monitored, evaluated, addressed and/or treated.   2013 AHA/ACC/TOS guideline for the management of overweight and obesity in adults: a report of the American College of Cardiology/American Heart Association Task Force on Practice Guidelines and The Obesity Society    Chronic cough  -     Spirometry with/without bronchodilator; Future; Expected date: 08/30/2023  -     Angiotensin Converting Enzyme; Future; Expected date: 02/27/2023  -     ADELFO Screen w/Reflex; Future; Expected date: 02/27/2023  -     Rheumatoid Factor; Future; Expected date: 02/27/2023  -     COVID-19 (SARS CoV-2) IgG Antibody; Future; Expected date: 02/27/2023  -     CT Chest Without Contrast; Future; Expected date: 02/27/2023  -     CT Sinuses without Contrast; Future; Expected date: 02/27/2023    Seasonal allergic rhinitis due to other allergic trigger  -     predniSONE (DELTASONE) 20 MG tablet; Prednisone 60 mg/ day for 3 days, 40 mg/day for 3 days,20 mg/ day for 3  days, (1/2 tablet )10 mg a day for 3 days.  Dispense: 20 tablet; Refill: 0  -     Aspergillus fumagatus IgE; Future; Expected date: 02/27/2023  -     Bermuda grass IgE; Future; Expected date: 02/27/2023  -     Cat epithelium IgE; Future; Expected date: 02/27/2023  -     Cladosporium IgE; Future; Expected date: 02/27/2023  -     Cockroach, American IgE; Future; Expected date: 02/27/2023  -     [place], bald IgE; Future; Expected date: 02/27/2023  -     D. farinae IgE; Future; Expected date: 02/27/2023  -     D. pteronyssinus IgE; Future; Expected date: 02/27/2023  -     Dog dander IgE; Future; Expected date: 02/27/2023  -     Plantain, English IgE; Future; Expected date: 02/27/2023  -     Pineda grass IgE; Future; Expected date: 02/27/2023  -     Marsh elder, rough IgE; Future; Expected date: 02/27/2023  -     Mugwort IgE; Future; Expected date: 02/27/2023  -     Nettle IgE; Future; Expected date: 02/27/2023  -     Oak, white IgE; Future; Expected date: 02/27/2023  -     Penicillium IgE; Future; Expected date: 02/27/2023  -     Ragweed, short, common IgE; Future; Expected date: 02/27/2023  -     Martin IgE; Future; Expected date: 02/27/2023  -     Allergen, Cocklebur; Future; Expected date: 02/27/2023  -     Allergen, Elm Cedar; Future; Expected date: 02/27/2023  -     Allergen, Meadow Grass ([place] Blue); Future; Expected date: 02/27/2023  -     Allergen, Mucor Racemosus; Future; Expected date: 02/27/2023  -     Allergen, Pecan Hickory IgE; Future; Expected date: 02/27/2023  -     Allergen, White Kevin; Future; Expected date: 02/27/2023  -     Allergen-Alternaria Alternata; Future; Expected date: 02/27/2023  -     Allergen-[place]; Future; Expected date: 02/27/2023  -     Allergen-Common Pigweed; Future; Expected date: 02/27/2023  -     Allergen-Silver Birch; Future; Expected date: 02/27/2023  -     Feather Panel #2; Future; Expected date: 02/27/2023  -     RAST Allergen for Eastern [place]; Future; Expected date:  02/27/2023  -     RAST Allergen Maple ([place]); Future; Expected date: 02/27/2023  -     RAST Allergen [place]; Future; Expected date: 02/27/2023  -     RAST Allergen, Lamb's Quarters; Future; Expected date: 02/27/2023  -     RAST Allergen, Sheep [place]ellow Dock); Future; Expected date: 02/27/2023  -     fluticasone propionate (FLONASE) 50 mcg/actuation nasal spray; 2 sprays (100 mcg total) by Each Nostril route once daily.  Dispense: 16 g; Refill: 11    Class 2 obesity due to excess calories without serious comorbidity with body mass index (BMI) of 39.0 to 39.9 in adult    Cough variant asthma  -     Spirometry with/without bronchodilator; Future; Expected date: 08/30/2023  -     fluticasone-salmeterol diskus inhaler 250-50 mcg; Inhale 1 puff into the lungs 2 (two) times daily. Wash out mouth after use.  Dispense: 1 each; Refill: 11    Chronic frontal sinusitis  -     CT Sinuses without Contrast; Future; Expected date: 02/27/2023          Outpatient Encounter Medications as of 2/27/2023   Medication Sig Dispense Refill    amLODIPine (NORVASC) 5 MG tablet amlodipine Take tablet (oral) 1 time per day No date recorded tablet 1 time per day oral No set duration recorded No set duration amount recorded active 5 mg 90 tablet 1    doxycycline (MONODOX) 100 MG capsule Take 1 capsule (100 mg total) by mouth every 12 (twelve) hours. for 10 days 20 capsule 0    (Magic mouthwash) 1:1:1 diphenhydrAMINE(Benadryl) 12.5mg/5ml liq, aluminum & magnesium hydroxide-simethicone (Maalox), LIDOcaine viscous 2% Swish and spit 5 mLs every 4 (four) hours as needed. for mouth sores (Patient not taking: Reported on 2/24/2023) 360 mL 0    azelastine (ASTELIN) 137 mcg (0.1 %) nasal spray 1 spray (137 mcg total) by Nasal route 2 (two) times daily. (Patient not taking: Reported on 2/24/2023) 90 mL 0    fluticasone propionate (FLONASE) 50 mcg/actuation nasal spray 2 sprays (100 mcg total) by Each Nostril route once daily. 16 g 11     fluticasone-salmeterol diskus inhaler 250-50 mcg Inhale 1 puff into the lungs 2 (two) times daily. Wash out mouth after use. 1 each 11    omeprazole (PRILOSEC) 20 MG capsule Take 2 capsules (40 mg total) by mouth once daily. (Patient not taking: Reported on 2023) 90 capsule 0    predniSONE (DELTASONE) 20 MG tablet Prednisone 60 mg/ day for 3 days, 40 mg/day for 3 days,20 mg/ day for 3 days, (1/2 tablet )10 mg a day for 3 days. 20 tablet 0    [DISCONTINUED] amLODIPine (NORVASC) 5 MG tablet amlodipine Take tablet (oral) 1 time per day No date recorded tablet 1 time per day oral No set duration recorded No set duration amount recorded active 5 mg       No facility-administered encounter medications on file as of 2023.     Plan:       Requested Prescriptions     Signed Prescriptions Disp Refills    predniSONE (DELTASONE) 20 MG tablet 20 tablet 0     Sig: Prednisone 60 mg/ day for 3 days, 40 mg/day for 3 days,20 mg/ day for 3 days, (1/2 tablet )10 mg a day for 3 days.    fluticasone propionate (FLONASE) 50 mcg/actuation nasal spray 16 g 11     Si sprays (100 mcg total) by Each Nostril route once daily.    fluticasone-salmeterol diskus inhaler 250-50 mcg 1 each 11     Sig: Inhale 1 puff into the lungs 2 (two) times daily. Wash out mouth after use.     Problem List Items Addressed This Visit       Allergic rhinitis    Relevant Medications    predniSONE (DELTASONE) 20 MG tablet    fluticasone propionate (FLONASE) 50 mcg/actuation nasal spray    Other Relevant Orders    Aspergillus fumagatus IgE    Bermuda grass IgE    Cat epithelium IgE    Cladosporium IgE    Cockroach, American IgE    Elbert, bald IgE    D. farinae IgE    D. pteronyssinus IgE    Dog dander IgE    Plantain, English IgE    Pineda grass IgE    Marsh elder, rough IgE    Mugwort IgE    Nettle IgE    Oak, white IgE    Penicillium IgE    Ragweed, short, common IgE    Martin IgE    Allergen, Cocklebur    Allergen, Elm [place]    Allergen, [place]  Grass ([place] Blue)    Allergen, Mucor Racemosus    Allergen, Pecan Hickory IgE    Allergen, White Kevin    Allergen-Alternaria Alternata    Allergen-[place]    Allergen-Common Pigweed    Allergen-Silver Birch    Feather Panel #2    RAST Allergen for Eastern [place]    RAST Allergen Maple ([place])    RAST Allergen [place]    RAST Allergen, Lamb's Quarters    RAST Allergen, Sheep Sorrel(Yellow Dock)    Chronic cough - Primary    Relevant Orders    Spirometry with/without bronchodilator    Angiotensin Converting Enzyme    ADELFO Screen w/Reflex    Rheumatoid Factor    COVID-19 (SARS CoV-2) IgG Antibody    CT Chest Without Contrast    CT Sinuses without Contrast    Class 2 obesity due to excess calories with body mass index (BMI) of 39.0 to 39.9 in adult    Current Assessment & Plan     Last documentation =       BMI noted to be elevated. Changes in weight over time reviewed in chart (if available) and by patient recollection. Patient advised and counseled concerning the adverse medical consequences of obesity. Treatment options discussed - calorie restriction, increasing calorie expenditure, medical and surgical options noted.  The patient's severe obesity was monitored, evaluated, addressed and/or treated.   2013 AHA/ACC/TOS guideline for the management of overweight and obesity in adults: a report of the American College of Cardiology/American Heart Association Task Force on Practice Guidelines and The Obesity Society            Other Visit Diagnoses       Cough variant asthma        Relevant Medications    fluticasone-salmeterol diskus inhaler 250-50 mcg    Other Relevant Orders    Spirometry with/without bronchodilator    Chronic frontal sinusitis        Relevant Orders    CT Sinuses without Contrast               Follow up in about 4 weeks (around 3/27/2023) for Review progress, Review jacinto - on return, Review CT/PET - on return visit.    MEDICAL DECISION MAKING: Moderate to high complexity.  Overall, the  multiple problems listed are of moderate to high severity that may impact quality of life and activities of daily living. Side effects of medications, treatment plan as well as options and alternatives reviewed and discussed with patient. There was counseling of patient concerning these issues.    Total time spent in counseling and coordination of care - 60  minutes of total time spent on the encounter, which includes face to face time and non-face to face time preparing to see the patient (eg, review of tests), Obtaining and/or reviewing separately obtained history, Documenting clinical information in the electronic or other health record, Independently interpreting results (not separately reported) and communicating results to the patient/family/caregiver, or Care coordination (not separately reported).    Time was used in discussion of prognosis, risks, benefits of treatment, instructions and compliance with regimen . Discussion with other physicians and/or health care providers - home health or for use of durable medical equipment (oxygen, nebulizers, CPAP, BiPAP) occurred.

## 2023-03-01 ENCOUNTER — LAB VISIT (OUTPATIENT)
Dept: LAB | Facility: HOSPITAL | Age: 33
End: 2023-03-01
Attending: UROLOGY
Payer: COMMERCIAL

## 2023-03-01 ENCOUNTER — OFFICE VISIT (OUTPATIENT)
Dept: UROLOGY | Facility: CLINIC | Age: 33
End: 2023-03-01
Payer: COMMERCIAL

## 2023-03-01 VITALS
SYSTOLIC BLOOD PRESSURE: 144 MMHG | TEMPERATURE: 101 F | BODY MASS INDEX: 38.79 KG/M2 | WEIGHT: 270.94 LBS | HEART RATE: 101 BPM | DIASTOLIC BLOOD PRESSURE: 97 MMHG | HEIGHT: 70 IN

## 2023-03-01 DIAGNOSIS — E29.1 HYPOGONADISM MALE: Primary | ICD-10-CM

## 2023-03-01 DIAGNOSIS — N52.9 ERECTILE DYSFUNCTION, UNSPECIFIED ERECTILE DYSFUNCTION TYPE: ICD-10-CM

## 2023-03-01 DIAGNOSIS — E29.1 HYPOGONADISM MALE: ICD-10-CM

## 2023-03-01 PROCEDURE — 3044F HG A1C LEVEL LT 7.0%: CPT | Mod: CPTII,S$GLB,, | Performed by: UROLOGY

## 2023-03-01 PROCEDURE — 3080F DIAST BP >= 90 MM HG: CPT | Mod: CPTII,S$GLB,, | Performed by: UROLOGY

## 2023-03-01 PROCEDURE — 1159F PR MEDICATION LIST DOCUMENTED IN MEDICAL RECORD: ICD-10-PCS | Mod: CPTII,S$GLB,, | Performed by: UROLOGY

## 2023-03-01 PROCEDURE — 99204 PR OFFICE/OUTPT VISIT, NEW, LEVL IV, 45-59 MIN: ICD-10-PCS | Mod: S$GLB,,, | Performed by: UROLOGY

## 2023-03-01 PROCEDURE — 3077F PR MOST RECENT SYSTOLIC BLOOD PRESSURE >= 140 MM HG: ICD-10-PCS | Mod: CPTII,S$GLB,, | Performed by: UROLOGY

## 2023-03-01 PROCEDURE — 82672 ASSAY OF ESTROGEN: CPT | Performed by: UROLOGY

## 2023-03-01 PROCEDURE — 3044F PR MOST RECENT HEMOGLOBIN A1C LEVEL <7.0%: ICD-10-PCS | Mod: CPTII,S$GLB,, | Performed by: UROLOGY

## 2023-03-01 PROCEDURE — 84270 ASSAY OF SEX HORMONE GLOBUL: CPT | Performed by: UROLOGY

## 2023-03-01 PROCEDURE — 99999 PR PBB SHADOW E&M-EST. PATIENT-LVL III: ICD-10-PCS | Mod: PBBFAC,,, | Performed by: UROLOGY

## 2023-03-01 PROCEDURE — 3008F BODY MASS INDEX DOCD: CPT | Mod: CPTII,S$GLB,, | Performed by: UROLOGY

## 2023-03-01 PROCEDURE — 83002 ASSAY OF GONADOTROPIN (LH): CPT | Performed by: UROLOGY

## 2023-03-01 PROCEDURE — 3077F SYST BP >= 140 MM HG: CPT | Mod: CPTII,S$GLB,, | Performed by: UROLOGY

## 2023-03-01 PROCEDURE — 1160F PR REVIEW ALL MEDS BY PRESCRIBER/CLIN PHARMACIST DOCUMENTED: ICD-10-PCS | Mod: CPTII,S$GLB,, | Performed by: UROLOGY

## 2023-03-01 PROCEDURE — 3080F PR MOST RECENT DIASTOLIC BLOOD PRESSURE >= 90 MM HG: ICD-10-PCS | Mod: CPTII,S$GLB,, | Performed by: UROLOGY

## 2023-03-01 PROCEDURE — 3008F PR BODY MASS INDEX (BMI) DOCUMENTED: ICD-10-PCS | Mod: CPTII,S$GLB,, | Performed by: UROLOGY

## 2023-03-01 PROCEDURE — 1160F RVW MEDS BY RX/DR IN RCRD: CPT | Mod: CPTII,S$GLB,, | Performed by: UROLOGY

## 2023-03-01 PROCEDURE — 1159F MED LIST DOCD IN RCRD: CPT | Mod: CPTII,S$GLB,, | Performed by: UROLOGY

## 2023-03-01 PROCEDURE — 84403 ASSAY OF TOTAL TESTOSTERONE: CPT | Performed by: UROLOGY

## 2023-03-01 PROCEDURE — 99999 PR PBB SHADOW E&M-EST. PATIENT-LVL III: CPT | Mod: PBBFAC,,, | Performed by: UROLOGY

## 2023-03-01 PROCEDURE — 99204 OFFICE O/P NEW MOD 45 MIN: CPT | Mod: S$GLB,,, | Performed by: UROLOGY

## 2023-03-01 PROCEDURE — 84146 ASSAY OF PROLACTIN: CPT | Performed by: UROLOGY

## 2023-03-01 NOTE — PROGRESS NOTES
Chief Complaint:   Encounter Diagnoses   Name Primary?    Hypogonadism male Yes    Erectile dysfunction, unspecified erectile dysfunction type        HPI:  32-year-old gentleman who comes in with signs and symptoms consistent with hypogonadism.  Has decreased libido, difficulty in maintaining an erection and obtaining an orgasm.  He is decreased energy, no lower urinary tract symptoms, no gross hematuria, does have a history of smoking.  With this history is relatively sparse.  No other urological history, no family history of urological cancers or stones.  Thus far no labs have been ordered.    Allergies:  Patient has no known allergies.    Medications:  has a current medication list which includes the following prescription(s): diphenhydramine hcl, amlodipine, azelastine, doxycycline, fluticasone propionate, fluticasone-salmeterol 250-50 mcg/dose, omeprazole, and prednisone.    Review of Systems:  General: No fever, chills, fatigability, or weight loss.  Skin: No rashes, itching, or changes in color or texture of skin.  Chest: Denies RIOS, cyanosis, wheezing, cough, and sputum production.  Abdomen: Appetite fine. No weight loss. Denies diarrhea, abdominal pain, hematemesis, or blood in stool.  Musculoskeletal: No joint stiffness or swelling. Denies back pain.  : As above.  All other review of systems negative.    PMH:   has a past medical history of Hypertension.    PSH:   has no past surgical history on file.    FamHx: family history includes Diabetes in his father; Hypertension in his mother.    SocHx:  reports that he has never smoked. He has never used smokeless tobacco. No history on file for alcohol use and drug use.      Physical Exam:  Vitals:    03/01/23 1538   BP: (!) 144/97   Pulse: 101   Temp: (!) 100.7 °F (38.2 °C)     General: A&Ox3, no apparent distress, no deformities  Neck: No masses, normal ROM  Lungs: normal inspiration, no use of accessory muscles  Heart: normal pulse, no arrhythmias  Abdomen:  Soft, NT, ND, no masses, no hernias, no hepatosplenomegaly  Skin: The skin is warm and dry. No jaundice.  Ext: No c/c/e.  : 3/23- Test desc yessenia, no abnormalities of epididymus. Normal penile and scrotal skin. Meatus normal.    Labs/Studies:   Pending    Impression/Plan:     1. Hypogonadism- will obtain labs today to determine if present and what the dosage should be.  We discussed options of creams, injections or pellets.  At this point he would like to pursue cutaneous creams, will call in the prescription after the labs have returned.  See me in 3 months and repeat the labs.      2. Erectile dysfunction- hold on medical therapy as this could improve with hypogonadism therapy, call if this becomes worse.  Otherwise re-evaluate at the next appointment, as some day he may require medical management.

## 2023-03-02 LAB
LH SERPL-ACNC: 5.2 MIU/ML (ref 0.6–12.1)
PROLACTIN SERPL IA-MCNC: 11.3 NG/ML (ref 3.5–19.4)

## 2023-03-03 ENCOUNTER — HOSPITAL ENCOUNTER (EMERGENCY)
Facility: HOSPITAL | Age: 33
Discharge: HOME | End: 2023-03-04
Attending: EMERGENCY MEDICINE
Payer: COMMERCIAL

## 2023-03-03 ENCOUNTER — TELEPHONE (OUTPATIENT)
Dept: INTERNAL MEDICINE | Facility: CLINIC | Age: 33
End: 2023-03-03
Payer: COMMERCIAL

## 2023-03-03 DIAGNOSIS — R50.9 FEVER, UNSPECIFIED FEVER CAUSE: Primary | ICD-10-CM

## 2023-03-03 DIAGNOSIS — I10 HYPERTENSION: ICD-10-CM

## 2023-03-03 DIAGNOSIS — E87.6 HYPOKALEMIA: ICD-10-CM

## 2023-03-03 DIAGNOSIS — R00.0 TACHYCARDIA: ICD-10-CM

## 2023-03-03 LAB
ALBUMIN SERPL BCP-MCNC: 3.5 G/DL (ref 3.5–5.2)
ALP SERPL-CCNC: 65 U/L (ref 55–135)
ALT SERPL W/O P-5'-P-CCNC: 27 U/L (ref 10–44)
ANION GAP SERPL CALC-SCNC: 11 MMOL/L (ref 8–16)
AST SERPL-CCNC: 31 U/L (ref 10–40)
BASOPHILS # BLD AUTO: 0.01 K/UL (ref 0–0.2)
BASOPHILS NFR BLD: 0.3 % (ref 0–1.9)
BILIRUB SERPL-MCNC: 0.4 MG/DL (ref 0.1–1)
BILIRUB UR QL STRIP: NEGATIVE
BNP SERPL-MCNC: 48 PG/ML (ref 0–99)
BUN SERPL-MCNC: 8 MG/DL (ref 6–20)
CALCIUM SERPL-MCNC: 9.1 MG/DL (ref 8.7–10.5)
CHLORIDE SERPL-SCNC: 108 MMOL/L (ref 95–110)
CLARITY UR: CLEAR
CO2 SERPL-SCNC: 23 MMOL/L (ref 23–29)
COLOR UR: YELLOW
CREAT SERPL-MCNC: 1 MG/DL (ref 0.5–1.4)
DIFFERENTIAL METHOD: ABNORMAL
EOSINOPHIL # BLD AUTO: 0 K/UL (ref 0–0.5)
EOSINOPHIL NFR BLD: 0.3 % (ref 0–8)
ERYTHROCYTE [DISTWIDTH] IN BLOOD BY AUTOMATED COUNT: 14.4 % (ref 11.5–14.5)
EST. GFR  (NO RACE VARIABLE): >60 ML/MIN/1.73 M^2
GLUCOSE SERPL-MCNC: 132 MG/DL (ref 70–110)
GLUCOSE UR QL STRIP: NEGATIVE
HCT VFR BLD AUTO: 37.5 % (ref 40–54)
HGB BLD-MCNC: 12.6 G/DL (ref 14–18)
HGB UR QL STRIP: NEGATIVE
IMM GRANULOCYTES # BLD AUTO: 0.06 K/UL (ref 0–0.04)
IMM GRANULOCYTES NFR BLD AUTO: 1.5 % (ref 0–0.5)
INFLUENZA A, MOLECULAR: NEGATIVE
INFLUENZA B, MOLECULAR: NEGATIVE
KETONES UR QL STRIP: NEGATIVE
LACTATE SERPL-SCNC: 1.6 MMOL/L (ref 0.5–2.2)
LEUKOCYTE ESTERASE UR QL STRIP: NEGATIVE
LYMPHOCYTES # BLD AUTO: 0.7 K/UL (ref 1–4.8)
LYMPHOCYTES NFR BLD: 16.7 % (ref 18–48)
MCH RBC QN AUTO: 28.6 PG (ref 27–31)
MCHC RBC AUTO-ENTMCNC: 33.6 G/DL (ref 32–36)
MCV RBC AUTO: 85 FL (ref 82–98)
MONOCYTES # BLD AUTO: 0.3 K/UL (ref 0.3–1)
MONOCYTES NFR BLD: 7.9 % (ref 4–15)
NEUTROPHILS # BLD AUTO: 2.9 K/UL (ref 1.8–7.7)
NEUTROPHILS NFR BLD: 73.3 % (ref 38–73)
NITRITE UR QL STRIP: NEGATIVE
NRBC BLD-RTO: 0 /100 WBC
PH UR STRIP: 7 [PH] (ref 5–8)
PLATELET # BLD AUTO: 188 K/UL (ref 150–450)
PMV BLD AUTO: 10.7 FL (ref 9.2–12.9)
POTASSIUM SERPL-SCNC: 3.2 MMOL/L (ref 3.5–5.1)
PROT SERPL-MCNC: 7.6 G/DL (ref 6–8.4)
PROT UR QL STRIP: ABNORMAL
RBC # BLD AUTO: 4.4 M/UL (ref 4.6–6.2)
SARS-COV-2 RDRP RESP QL NAA+PROBE: NEGATIVE
SODIUM SERPL-SCNC: 142 MMOL/L (ref 136–145)
SP GR UR STRIP: 1.02 (ref 1–1.03)
SPECIMEN SOURCE: NORMAL
TROPONIN I SERPL DL<=0.01 NG/ML-MCNC: <0.006 NG/ML (ref 0–0.03)
URN SPEC COLLECT METH UR: ABNORMAL
UROBILINOGEN UR STRIP-ACNC: ABNORMAL EU/DL
WBC # BLD AUTO: 3.9 K/UL (ref 3.9–12.7)

## 2023-03-03 PROCEDURE — 81003 URINALYSIS AUTO W/O SCOPE: CPT | Mod: 59 | Performed by: NURSE PRACTITIONER

## 2023-03-03 PROCEDURE — 99285 EMERGENCY DEPT VISIT HI MDM: CPT | Mod: 25

## 2023-03-03 PROCEDURE — 96360 HYDRATION IV INFUSION INIT: CPT

## 2023-03-03 PROCEDURE — 82550 ASSAY OF CK (CPK): CPT | Performed by: NURSE PRACTITIONER

## 2023-03-03 PROCEDURE — 93010 ELECTROCARDIOGRAM REPORT: CPT | Mod: ,,, | Performed by: INTERNAL MEDICINE

## 2023-03-03 PROCEDURE — U0002 COVID-19 LAB TEST NON-CDC: HCPCS | Performed by: NURSE PRACTITIONER

## 2023-03-03 PROCEDURE — 83880 ASSAY OF NATRIURETIC PEPTIDE: CPT | Performed by: NURSE PRACTITIONER

## 2023-03-03 PROCEDURE — 25000003 PHARM REV CODE 250: Performed by: NURSE PRACTITIONER

## 2023-03-03 PROCEDURE — 83605 ASSAY OF LACTIC ACID: CPT | Performed by: NURSE PRACTITIONER

## 2023-03-03 PROCEDURE — 93005 ELECTROCARDIOGRAM TRACING: CPT

## 2023-03-03 PROCEDURE — 85025 COMPLETE CBC W/AUTO DIFF WBC: CPT | Performed by: NURSE PRACTITIONER

## 2023-03-03 PROCEDURE — 84484 ASSAY OF TROPONIN QUANT: CPT | Performed by: NURSE PRACTITIONER

## 2023-03-03 PROCEDURE — 80053 COMPREHEN METABOLIC PANEL: CPT | Performed by: NURSE PRACTITIONER

## 2023-03-03 PROCEDURE — 93010 EKG 12-LEAD: ICD-10-PCS | Mod: ,,, | Performed by: INTERNAL MEDICINE

## 2023-03-03 PROCEDURE — 87502 INFLUENZA DNA AMP PROBE: CPT | Performed by: NURSE PRACTITIONER

## 2023-03-03 PROCEDURE — 80307 DRUG TEST PRSMV CHEM ANLYZR: CPT | Performed by: NURSE PRACTITIONER

## 2023-03-03 RX ORDER — IBUPROFEN 800 MG/1
800 TABLET ORAL
Status: DISCONTINUED | OUTPATIENT
Start: 2023-03-03 | End: 2023-03-04 | Stop reason: HOSPADM

## 2023-03-03 RX ADMIN — SODIUM CHLORIDE 1000 ML: 9 INJECTION, SOLUTION INTRAVENOUS at 08:03

## 2023-03-03 NOTE — TELEPHONE ENCOUNTER
----- Message from Kelsy Mcmanus sent at 3/3/2023 11:46 AM CST -----  States he saw Ms Aarti Tirado on 2/24 for chronic cough and fever. States he is having muscle spasm and weakness and he has a shaking ness in his voice and fatigue. States he also still has the cough and fever. He has an appt on 3/10 to see Dr Wu. States she would like to speak to the nurse. Please call Preeti Terry [phone]. Thank you

## 2023-03-03 NOTE — Clinical Note
"Joon Alcaraz" Harrison was seen and treated in our emergency department on 3/3/2023.  He may return to work on 03/05/2023.       If you have any questions or concerns, please don't hesitate to call.      DenisseRN RN    "

## 2023-03-03 NOTE — TELEPHONE ENCOUNTER
Spoke with patient's wife, Preeti and she stated that they decided to take patient to the emergency room to be seen.

## 2023-03-04 VITALS
WEIGHT: 267.19 LBS | BODY MASS INDEX: 38.25 KG/M2 | HEART RATE: 97 BPM | SYSTOLIC BLOOD PRESSURE: 138 MMHG | HEIGHT: 70 IN | OXYGEN SATURATION: 100 % | DIASTOLIC BLOOD PRESSURE: 78 MMHG | RESPIRATION RATE: 17 BRPM | TEMPERATURE: 103 F

## 2023-03-04 LAB
AMPHET+METHAMPHET UR QL: NEGATIVE
BARBITURATES UR QL SCN>200 NG/ML: NEGATIVE
BENZODIAZ UR QL SCN>200 NG/ML: NEGATIVE
BZE UR QL SCN: NEGATIVE
CANNABINOIDS UR QL SCN: NEGATIVE
CK SERPL-CCNC: 178 U/L (ref 20–200)
CREAT UR-MCNC: 290.5 MG/DL (ref 23–375)
CRP SERPL-MCNC: 3.1 MG/L (ref 0–8.2)
ERYTHROCYTE [SEDIMENTATION RATE] IN BLOOD BY WESTERGREN METHOD: 15 MM/HR (ref 0–10)
MAGNESIUM SERPL-MCNC: 2 MG/DL (ref 1.6–2.6)
METHADONE UR QL SCN>300 NG/ML: NEGATIVE
OPIATES UR QL SCN: NEGATIVE
PCP UR QL SCN>25 NG/ML: NEGATIVE
PROCALCITONIN SERPL IA-MCNC: 0.02 NG/ML
RHEUMATOID FACT SERPL-ACNC: <13 IU/ML (ref 0–15)
TOXICOLOGY INFORMATION: NORMAL
TSH SERPL DL<=0.005 MIU/L-ACNC: 0.64 UIU/ML (ref 0.4–4)

## 2023-03-04 PROCEDURE — 85651 RBC SED RATE NONAUTOMATED: CPT | Performed by: EMERGENCY MEDICINE

## 2023-03-04 PROCEDURE — 84145 PROCALCITONIN (PCT): CPT | Performed by: EMERGENCY MEDICINE

## 2023-03-04 PROCEDURE — 86431 RHEUMATOID FACTOR QUANT: CPT | Performed by: EMERGENCY MEDICINE

## 2023-03-04 PROCEDURE — 36415 COLL VENOUS BLD VENIPUNCTURE: CPT | Performed by: EMERGENCY MEDICINE

## 2023-03-04 PROCEDURE — 83735 ASSAY OF MAGNESIUM: CPT | Performed by: EMERGENCY MEDICINE

## 2023-03-04 PROCEDURE — 86140 C-REACTIVE PROTEIN: CPT | Performed by: EMERGENCY MEDICINE

## 2023-03-04 PROCEDURE — 86038 ANTINUCLEAR ANTIBODIES: CPT | Performed by: EMERGENCY MEDICINE

## 2023-03-04 PROCEDURE — 25500020 PHARM REV CODE 255: Performed by: EMERGENCY MEDICINE

## 2023-03-04 PROCEDURE — 84443 ASSAY THYROID STIM HORMONE: CPT | Performed by: EMERGENCY MEDICINE

## 2023-03-04 RX ORDER — POTASSIUM CHLORIDE 20 MEQ/1
40 TABLET, EXTENDED RELEASE ORAL
Status: DISCONTINUED | OUTPATIENT
Start: 2023-03-04 | End: 2023-03-04 | Stop reason: HOSPADM

## 2023-03-04 RX ADMIN — IOHEXOL 100 ML: 350 INJECTION, SOLUTION INTRAVENOUS at 01:03

## 2023-03-04 NOTE — FIRST PROVIDER EVALUATION
"Medical screening examination initiated.  I have conducted a focused provider triage encounter, findings are as follows:    Brief history of present illness:  Patient presents with fever and tremors.  Onset was 8 days ago.    Vitals:    03/03/23 1901   BP: (!) 190/101   BP Location: Right arm   Patient Position: Sitting   Pulse: (!) 131   Resp: 20   Temp: (!) 102.3 °F (39.1 °C)   TempSrc: Oral   SpO2: 100%   Weight: 121.2 kg (267 lb 3.2 oz)   Height: 5' 10" (1.778 m)       Pertinent physical exam:      Brief workup plan:  Labs, imaging, fluids    Preliminary workup initiated; this workup will be continued and followed by the physician or advanced practice provider that is assigned to the patient when roomed.  "

## 2023-03-04 NOTE — ED PROVIDER NOTES
"SCRIBE #1 NOTE: I, Mena Busby, am scribing for, and in the presence of, Delbert Calderon MD. I have scribed the entire note.       History     Chief Complaint   Patient presents with    Fever     X 8 days, hands  and thigh shaking x 2 weeks      Review of patient's allergies indicates:  No Known Allergies      History of Present Illness     HPI    3/4/2023, 12:22 AM  History obtained from the patient      History of Present Illness: Joon Terry is a 32 y.o. male patient with a PMHx of hypertension who presents to the Emergency Department for evaluation of a fever which onset 8 days PTA. Symptoms are constant and moderate in severity. Pt states he started having involuntary tremors in both hands and thighs about 2 weeks ago. It does not happen at rest, but will start when he moves or stretches. He then started having a fever 8 days ago with a Tmax of 102 today. Pt states "he feels when his body is getting hot and has a temperature in  range". He has also had a chronic productive cough for the past 6 months and was prescribed a 10 day dose of doxycycline and states it has improved cough. Associated sxs include weakness and fatigue. Patient denies any rhinorrhea, sore throat, abd pain, n/v, ear pain, generalized myalgias, and all other sxs at this time. Pt went to urgent care in mid January for hives and was given a steroid shot, and has not returned since. He has taken ibuprofen and tylenol for the fever but does not resolve sxs. He denies recent travels or being around anyone sick. He had had prior cxr with no acute findings.  No further complaints or concerns at this time.       Arrival mode: Personal vehicle     PCP: Primary Doctor No        Past Medical History:  Past Medical History:   Diagnosis Date    Hypertension        Past Surgical History:  No past surgical history on file.      Family History:  Family History   Problem Relation Age of Onset    Hypertension Mother     Diabetes Father     " Asthma Sister        Social History:  Social History     Tobacco Use    Smoking status: Never    Smokeless tobacco: Current     Types: Chew   Substance and Sexual Activity    Alcohol use: Yes     Comment: occassional    Drug use: Never    Sexual activity: Not on file        Review of Systems     Review of Systems   Constitutional:  Positive for fatigue and fever (tmax 102).   HENT:  Negative for ear pain, rhinorrhea and sore throat.    Respiratory:  Negative for shortness of breath.    Cardiovascular:  Negative for chest pain.   Gastrointestinal:  Negative for abdominal pain, nausea and vomiting.   Genitourinary:  Negative for dysuria.   Musculoskeletal:  Negative for back pain and myalgias (generalized).   Skin:  Negative for rash.   Neurological:  Positive for tremors (bilateral hands and thighs) and weakness.   Hematological:  Does not bruise/bleed easily.   All other systems reviewed and are negative.     Physical Exam     Initial Vitals [03/03/23 1901]   BP Pulse Resp Temp SpO2   (!) 190/101 (!) 131 20 (!) 102.3 °F (39.1 °C) 100 %      MAP       --          Physical Exam  Nursing Notes and Vital Signs Reviewed.  Constitutional: Patient is in no acute distress. Well-developed and well-nourished.  Head: Atraumatic. Normocephalic.  Eyes: PERRL. EOM intact. Conjunctivae are not pale. No scleral icterus.  ENT: Mucous membranes are moist. Oropharynx is clear and symmetric.    Neck: Supple. Full ROM. No lymphadenopathy.  Cardiovascular: Regular rate. Regular rhythm. No murmurs, rubs, or gallops. Distal pulses are 2+ and symmetric.  Pulmonary/Chest: No respiratory distress. Clear to auscultation bilaterally. No wheezing or rales.  Abdominal: Soft and non-distended.  There is no tenderness.  No rebound, guarding, or rigidity. Good bowel sounds.  Genitourinary: No CVA tenderness  Musculoskeletal: Moves all extremities. No obvious deformities. No edema. No calf tenderness.  Skin: Warm and dry.  Neurological:  Alert,  "awake, and appropriate.  Normal speech.  No acute focal neurological deficits are appreciated.  Psychiatric: Normal affect. Good eye contact. Appropriate in content.     ED Course   Procedures  ED Vital Signs:  Vitals:    03/03/23 1901 03/03/23 2345 03/04/23 0530   BP: (!) 190/101 137/87 138/78   Pulse: (!) 131 99 97   Resp: 20 20 17   Temp: (!) 102.3 °F (39.1 °C) 99.4 °F (37.4 °C) (!) 102.5 °F (39.2 °C)   TempSrc: Oral Oral    SpO2: 100% 97% 100%   Weight: 121.2 kg (267 lb 3.2 oz)     Height: 5' 10" (1.778 m)         Abnormal Lab Results:  Labs Reviewed   CBC W/ AUTO DIFFERENTIAL - Abnormal; Notable for the following components:       Result Value    RBC 4.40 (*)     Hemoglobin 12.6 (*)     Hematocrit 37.5 (*)     Immature Granulocytes 1.5 (*)     Immature Grans (Abs) 0.06 (*)     Lymph # 0.7 (*)     Gran % 73.3 (*)     Lymph % 16.7 (*)     All other components within normal limits   COMPREHENSIVE METABOLIC PANEL - Abnormal; Notable for the following components:    Potassium 3.2 (*)     Glucose 132 (*)     All other components within normal limits   URINALYSIS, REFLEX TO URINE CULTURE - Abnormal; Notable for the following components:    Protein, UA Trace (*)     Urobilinogen, UA 4.0-6.0 (*)     All other components within normal limits    Narrative:     Specimen Source->Urine   SEDIMENTATION RATE - Abnormal; Notable for the following components:    Sed Rate 15 (*)     All other components within normal limits   INFLUENZA A & B BY MOLECULAR   SARS-COV-2 RNA AMPLIFICATION, QUAL   B-TYPE NATRIURETIC PEPTIDE   TROPONIN I   LACTIC ACID, PLASMA   C-REACTIVE PROTEIN   PROCALCITONIN   ADELFO PROFILE I (SCREEN) W/ REFLEX   RHEUMATOID FACTOR   CK   CK   ADELFO PROFILE I (SCREEN) W/ REFLEX   RHEUMATOID FACTOR   MAGNESIUM   TSH   MAGNESIUM   TSH   DRUG SCREEN PANEL, URINE EMERGENCY   DRUG SCREEN PANEL, URINE EMERGENCY    Narrative:     Specimen Source->Urine        All Lab Results:  Results for orders placed or performed during the " hospital encounter of 03/03/23   Influenza A & B by Molecular    Specimen: Nasopharyngeal Swab   Result Value Ref Range    Influenza A, Molecular Negative Negative    Influenza B, Molecular Negative Negative    Flu A & B Source Nasal swab    COVID-19 Rapid Screening   Result Value Ref Range    SARS-CoV-2 RNA, Amplification, Qual Negative Negative   CBC auto differential   Result Value Ref Range    WBC 3.90 3.90 - 12.70 K/uL    RBC 4.40 (L) 4.60 - 6.20 M/uL    Hemoglobin 12.6 (L) 14.0 - 18.0 g/dL    Hematocrit 37.5 (L) 40.0 - 54.0 %    MCV 85 82 - 98 fL    MCH 28.6 27.0 - 31.0 pg    MCHC 33.6 32.0 - 36.0 g/dL    RDW 14.4 11.5 - 14.5 %    Platelets 188 150 - 450 K/uL    MPV 10.7 9.2 - 12.9 fL    Immature Granulocytes 1.5 (H) 0.0 - 0.5 %    Gran # (ANC) 2.9 1.8 - 7.7 K/uL    Immature Grans (Abs) 0.06 (H) 0.00 - 0.04 K/uL    Lymph # 0.7 (L) 1.0 - 4.8 K/uL    Mono # 0.3 0.3 - 1.0 K/uL    Eos # 0.0 0.0 - 0.5 K/uL    Baso # 0.01 0.00 - 0.20 K/uL    nRBC 0 0 /100 WBC    Gran % 73.3 (H) 38.0 - 73.0 %    Lymph % 16.7 (L) 18.0 - 48.0 %    Mono % 7.9 4.0 - 15.0 %    Eosinophil % 0.3 0.0 - 8.0 %    Basophil % 0.3 0.0 - 1.9 %    Differential Method Automated    Comprehensive metabolic panel   Result Value Ref Range    Sodium 142 136 - 145 mmol/L    Potassium 3.2 (L) 3.5 - 5.1 mmol/L    Chloride 108 95 - 110 mmol/L    CO2 23 23 - 29 mmol/L    Glucose 132 (H) 70 - 110 mg/dL    BUN 8 6 - 20 mg/dL    Creatinine 1.0 0.5 - 1.4 mg/dL    Calcium 9.1 8.7 - 10.5 mg/dL    Total Protein 7.6 6.0 - 8.4 g/dL    Albumin 3.5 3.5 - 5.2 g/dL    Total Bilirubin 0.4 0.1 - 1.0 mg/dL    Alkaline Phosphatase 65 55 - 135 U/L    AST 31 10 - 40 U/L    ALT 27 10 - 44 U/L    Anion Gap 11 8 - 16 mmol/L    eGFR >60 >60 mL/min/1.73 m^2   Brain natriuretic peptide   Result Value Ref Range    BNP 48 0 - 99 pg/mL   Troponin I   Result Value Ref Range    Troponin I <0.006 0.000 - 0.026 ng/mL   Urinalysis, Reflex to Urine Culture Urine, Clean Catch    Specimen:  Urine   Result Value Ref Range    Specimen UA Urine, Clean Catch     Color, UA Yellow Yellow, Straw, Terese    Appearance, UA Clear Clear    pH, UA 7.0 5.0 - 8.0    Specific Gravity, UA 1.020 1.005 - 1.030    Protein, UA Trace (A) Negative    Glucose, UA Negative Negative    Ketones, UA Negative Negative    Bilirubin (UA) Negative Negative    Occult Blood UA Negative Negative    Nitrite, UA Negative Negative    Urobilinogen, UA 4.0-6.0 (A) <2.0 EU/dL    Leukocytes, UA Negative Negative   Lactic acid, plasma   Result Value Ref Range    Lactate (Lactic Acid) 1.6 0.5 - 2.2 mmol/L   Sedimentation rate   Result Value Ref Range    Sed Rate 15 (H) 0 - 10 mm/Hr   C-reactive protein   Result Value Ref Range    CRP 3.1 0.0 - 8.2 mg/L   Procalcitonin   Result Value Ref Range    Procalcitonin 0.02 <0.25 ng/mL   CK   Result Value Ref Range     20 - 200 U/L   ADELFO   Result Value Ref Range    ADELFO Screen Negative <1:80 Negative <1:80   Rheumatoid Factor   Result Value Ref Range    Rheumatoid Factor <13.0 0.0 - 15.0 IU/mL   Magnesium   Result Value Ref Range    Magnesium 2.0 1.6 - 2.6 mg/dL   TSH   Result Value Ref Range    TSH 0.643 0.400 - 4.000 uIU/mL   Drug screen panel, in-house   Result Value Ref Range    Benzodiazepines Negative Negative    Methadone metabolites Negative Negative    Cocaine (Metab.) Negative Negative    Opiate Scrn, Ur Negative Negative    Barbiturate Screen, Ur Negative Negative    Amphetamine Screen, Ur Negative Negative    THC Negative Negative    Phencyclidine Negative Negative    Creatinine, Urine 290.5 23.0 - 375.0 mg/dL    Toxicology Information SEE COMMENT         Imaging Results:  Imaging Results              CT Chest With Contrast (Final result)  Result time 03/04/23 07:37:11      Final result by Cali Padilla MD (03/04/23 07:37:11)                   Impression:      Subtle mosaic ground-glass attenuation in the upper lungs.  Findings are nonspecific and could be related to artifact from  respiratory phase.  Mild small airways/small vessel disease could appear in a similar fashion.  Recommend clinical correlation and further follow-up/evaluation as warranted.      Electronically signed by: Cali Padilla  Date:    03/04/2023  Time:    07:37               Narrative:    EXAMINATION:  CT CHEST WITH CONTRAST    CLINICAL HISTORY:  Sepsis;Respiratory illness, nondiagnostic xray;    TECHNIQUE:  Low-dose axial images acquired from the chest after the administration of 100 cc Omnipaque 350 intravenous contrast.  Sagittal and coronal reformats were generated for further review.  All CT scans at this location are performed using dose modulation techniques as appropriate to a performed exam including the following: Automated exposure control; adjustment of the mA and/or kV according to patient size (this includes techniques or standardized protocols for targeted exams where dose is matched to indication/reason for exam; i. e. extremities or head); use of iterative reconstruction technique.    COMPARISON:  Chest radiograph 03/03/2023.    FINDINGS:  Base of Neck: No significant abnormality.    Aorta: Nonaneurysmal without significant atherosclerotic calcification.  No dissection evident.    Heart/pericardium: Normal size.  No pericardial effusion or coronary calcification.    Gianna/Mediastinum: No pathologic owen enlargement.    Upper Abdomen: No abnormality of the partially imaged upper abdomen.    Thoracic soft tissues: Normal.    Bones: No acute fracture. No suspicious lytic or sclerotic lesion.    Airways: Patent.    Lungs/pleura: Subtle mosaic ground-glass attenuation at the upper lungs.  No focal consolidation.  No pulmonary mass.  No effusion or pneumothorax.                                       X-Ray Chest 1 View (Final result)  Result time 03/03/23 19:48:21      Final result by Les Douglass MD (03/03/23 19:48:21)                   Impression:      No acute abnormality.      Electronically signed  by: Les Douglass  Date:    03/03/2023  Time:    19:48               Narrative:    EXAMINATION:  XR CHEST 1 VIEW    CLINICAL HISTORY:  Essential (primary) hypertension    TECHNIQUE:  Single frontal view of the chest was performed.    COMPARISON:  None    FINDINGS:  The lungs are clear, with normal appearance of pulmonary vasculature and no pleural effusion or pneumothorax.    The cardiac silhouette is normal in size. The hilar and mediastinal contours are unremarkable.    Bones are intact.                                     2:52 AM: Per STAT radiology, pt's CT chest with IV contrast results: Normal chest CT     The EKG was ordered, reviewed, and independently interpreted by the ED provider.  Interpretation time: 23:51  Rate: 94 BPM  Rhythm: normal sinus rhythm  Interpretation: No acute ST changes. No STEMI.         The Emergency Provider reviewed the vital signs and test results, which are outlined above.     ED Discussion     5:15 AM: Discussed pt's case with Dr. Navarrete ([facility]) who recommends HIV test and urine drug screen analysis. Can f/u outpatient    5:19 AM: Reassessed pt at this time. Discussed with pt all pertinent ED information and results. Discussed pt dx and plan of tx. Gave pt all f/u and return to the ED instructions. All questions and concerns were addressed at this time. Pt expresses understanding of information and instructions, and is comfortable with plan to discharge. Pt is stable for discharge.    I discussed with patient and/or family/caretaker that evaluation in the ED does not suggest any emergent or life threatening medical conditions requiring immediate intervention beyond what was provided in the ED, and I believe patient is safe for discharge.  Regardless, an unremarkable evaluation in the ED does not preclude the development or presence of a serious of life threatening condition. As such, patient was instructed to return immediately for any worsening or change in current  symptoms.        Medical Decision Making:   History:   Old Medical Records: I decided to obtain old medical records.  Old Records Summarized: records from clinic visits.       <> Summary of Records: Pulmonology had wanted to order an ADELOF, rheumatoid factor, CT chest, and CT sinus  Initial Assessment:   32-year-old male who presented to the emergency department reporting a fever for 8 days  Independently Interpreted Test(s):   I have ordered and independently interpreted X-rays - see prior notes.  Clinical Tests:   Lab Tests: Ordered and Reviewed  Radiological Study: Ordered and Reviewed  Medical Tests: Ordered and Reviewed  Other:   I have discussed this case with another health care provider.  Fever reportedly for 8 days.  Febrile here in the emergency department.  No infectious source identified.  With the length of the fever, I consulted Hospital Medicine and requested their evaluation and recommendations.  I asked if presentation warranted further inpatient workup. Dr. Navarrete came down and evaluated patient at bedside. He performed full H&P and does not see any benefit to hospitalization at this time.  He recommends a urine drug screen and an HIV.  He also states that patient clarified to him that the only temperature over 100.4 was the temperature today, making it 1 day of fever rather than 8 days.  Drug screen and HIV were added on to complete the workup as requested by Dr. Navarrete.  Patient declined HIV testing         ED Medication(s):  Medications   sodium chloride 0.9% bolus 1,000 mL 1,000 mL (0 mLs Intravenous Stopped 3/3/23 2135)   iohexoL (OMNIPAQUE 350) injection 100 mL (100 mLs Intravenous Given 3/4/23 0133)       Discharge Medication List as of 3/4/2023  5:18 AM           Follow-up Information       Follow-up with primary care physician and pulmonologist as planned.               [facility] - Emergency Dept..    Specialty: Emergency Medicine  Why: As needed, If symptoms worsen  Contact  information:  [id number] [facility] [address] [phone]                               Scribe Attestation:   Scribe #1: I performed the above scribed service and the documentation accurately describes the services I performed. I attest to the accuracy of the note.     Attending:   Physician Attestation Statement for Scribe #1: I, Delbert Calderon MD, personally performed the services described in this documentation, as scribed by Mena Busby, in my presence, and it is both accurate and complete.           Clinical Impression       ICD-10-CM ICD-9-CM   1. Fever, unspecified fever cause  R50.9 780.60   2. Tachycardia  R00.0 785.0   3. Hypertension  I10 401.9   4. Hypokalemia  E87.6 276.8       Disposition:   Disposition: Discharged  Condition: Stable      Delbert Calderon MD  03/07/23 031

## 2023-03-05 ENCOUNTER — PATIENT MESSAGE (OUTPATIENT)
Dept: INTERNAL MEDICINE | Facility: CLINIC | Age: 33
End: 2023-03-05
Payer: COMMERCIAL

## 2023-03-06 LAB
ANA SER QL IF: NORMAL
ESTROGEN SERPL-MCNC: 106 PG/ML

## 2023-03-08 LAB
ALBUMIN SERPL-MCNC: 4 G/DL (ref 3.6–5.1)
SHBG SERPL-SCNC: 31 NMOL/L (ref 10–50)
TESTOST FREE SERPL-MCNC: 66.4 PG/ML (ref 46–224)
TESTOST SERPL-MCNC: 457 NG/DL (ref 250–1100)
TESTOSTERONE.FREE+WB SERPL-MCNC: 122.2 NG/DL (ref 110–575)

## 2023-03-10 ENCOUNTER — OFFICE VISIT (OUTPATIENT)
Dept: INTERNAL MEDICINE | Facility: CLINIC | Age: 33
End: 2023-03-10
Payer: COMMERCIAL

## 2023-03-10 ENCOUNTER — TELEPHONE (OUTPATIENT)
Dept: INTERNAL MEDICINE | Facility: CLINIC | Age: 33
End: 2023-03-10
Payer: COMMERCIAL

## 2023-03-10 ENCOUNTER — LAB VISIT (OUTPATIENT)
Dept: LAB | Facility: HOSPITAL | Age: 33
End: 2023-03-10
Attending: PEDIATRICS
Payer: COMMERCIAL

## 2023-03-10 ENCOUNTER — TELEPHONE (OUTPATIENT)
Dept: PULMONOLOGY | Facility: CLINIC | Age: 33
End: 2023-03-10
Payer: COMMERCIAL

## 2023-03-10 ENCOUNTER — PATIENT MESSAGE (OUTPATIENT)
Dept: UROLOGY | Facility: CLINIC | Age: 33
End: 2023-03-10
Payer: COMMERCIAL

## 2023-03-10 VITALS
DIASTOLIC BLOOD PRESSURE: 88 MMHG | HEART RATE: 98 BPM | OXYGEN SATURATION: 98 % | WEIGHT: 269.81 LBS | HEIGHT: 70 IN | BODY MASS INDEX: 38.63 KG/M2 | SYSTOLIC BLOOD PRESSURE: 126 MMHG | TEMPERATURE: 100 F

## 2023-03-10 DIAGNOSIS — R50.9 FEVER, UNSPECIFIED FEVER CAUSE: Primary | ICD-10-CM

## 2023-03-10 DIAGNOSIS — R50.9 FEVER, UNSPECIFIED FEVER CAUSE: ICD-10-CM

## 2023-03-10 DIAGNOSIS — R05.3 CHRONIC COUGH: ICD-10-CM

## 2023-03-10 DIAGNOSIS — J30.89 SEASONAL ALLERGIC RHINITIS DUE TO OTHER ALLERGIC TRIGGER: ICD-10-CM

## 2023-03-10 LAB
HCV AB SERPL QL IA: NORMAL
RHEUMATOID FACT SERPL-ACNC: <13 IU/ML (ref 0–15)
SARS-COV-2 IGG SERPL IA-ACNC: <50 AU/ML
SARS-COV-2 IGG SERPL QL IA: NEGATIVE

## 2023-03-10 PROCEDURE — 86580 POCT TB SKIN TEST: ICD-10-PCS | Mod: S$GLB,,, | Performed by: PEDIATRICS

## 2023-03-10 PROCEDURE — 1159F MED LIST DOCD IN RCRD: CPT | Mod: CPTII,S$GLB,, | Performed by: PEDIATRICS

## 2023-03-10 PROCEDURE — 86003 ALLG SPEC IGE CRUDE XTRC EA: CPT | Performed by: INTERNAL MEDICINE

## 2023-03-10 PROCEDURE — 82164 ANGIOTENSIN I ENZYME TEST: CPT | Performed by: INTERNAL MEDICINE

## 2023-03-10 PROCEDURE — 3008F BODY MASS INDEX DOCD: CPT | Mod: CPTII,S$GLB,, | Performed by: PEDIATRICS

## 2023-03-10 PROCEDURE — 3074F PR MOST RECENT SYSTOLIC BLOOD PRESSURE < 130 MM HG: ICD-10-PCS | Mod: CPTII,S$GLB,, | Performed by: PEDIATRICS

## 2023-03-10 PROCEDURE — 86038 ANTINUCLEAR ANTIBODIES: CPT | Performed by: INTERNAL MEDICINE

## 2023-03-10 PROCEDURE — 87389 HIV-1 AG W/HIV-1&-2 AB AG IA: CPT | Performed by: PEDIATRICS

## 2023-03-10 PROCEDURE — 86431 RHEUMATOID FACTOR QUANT: CPT | Performed by: INTERNAL MEDICINE

## 2023-03-10 PROCEDURE — 99999 PR PBB SHADOW E&M-EST. PATIENT-LVL IV: ICD-10-PCS | Mod: PBBFAC,,, | Performed by: PEDIATRICS

## 2023-03-10 PROCEDURE — 1159F PR MEDICATION LIST DOCUMENTED IN MEDICAL RECORD: ICD-10-PCS | Mod: CPTII,S$GLB,, | Performed by: PEDIATRICS

## 2023-03-10 PROCEDURE — 36415 COLL VENOUS BLD VENIPUNCTURE: CPT | Performed by: INTERNAL MEDICINE

## 2023-03-10 PROCEDURE — 3079F PR MOST RECENT DIASTOLIC BLOOD PRESSURE 80-89 MM HG: ICD-10-PCS | Mod: CPTII,S$GLB,, | Performed by: PEDIATRICS

## 2023-03-10 PROCEDURE — 86480 TB TEST CELL IMMUN MEASURE: CPT | Performed by: PEDIATRICS

## 2023-03-10 PROCEDURE — 86580 TB INTRADERMAL TEST: CPT | Mod: S$GLB,,, | Performed by: PEDIATRICS

## 2023-03-10 PROCEDURE — 86769 SARS-COV-2 COVID-19 ANTIBODY: CPT | Performed by: INTERNAL MEDICINE

## 2023-03-10 PROCEDURE — 3074F SYST BP LT 130 MM HG: CPT | Mod: CPTII,S$GLB,, | Performed by: PEDIATRICS

## 2023-03-10 PROCEDURE — 87389 HIV-1 AG W/HIV-1&-2 AB AG IA: CPT | Mod: 91 | Performed by: PEDIATRICS

## 2023-03-10 PROCEDURE — 1160F PR REVIEW ALL MEDS BY PRESCRIBER/CLIN PHARMACIST DOCUMENTED: ICD-10-PCS | Mod: CPTII,S$GLB,, | Performed by: PEDIATRICS

## 2023-03-10 PROCEDURE — 1160F RVW MEDS BY RX/DR IN RCRD: CPT | Mod: CPTII,S$GLB,, | Performed by: PEDIATRICS

## 2023-03-10 PROCEDURE — 86803 HEPATITIS C AB TEST: CPT | Performed by: PEDIATRICS

## 2023-03-10 PROCEDURE — 86003 ALLG SPEC IGE CRUDE XTRC EA: CPT | Mod: 59 | Performed by: INTERNAL MEDICINE

## 2023-03-10 PROCEDURE — 3079F DIAST BP 80-89 MM HG: CPT | Mod: CPTII,S$GLB,, | Performed by: PEDIATRICS

## 2023-03-10 PROCEDURE — 99999 PR PBB SHADOW E&M-EST. PATIENT-LVL IV: CPT | Mod: PBBFAC,,, | Performed by: PEDIATRICS

## 2023-03-10 PROCEDURE — 99214 PR OFFICE/OUTPT VISIT, EST, LEVL IV, 30-39 MIN: ICD-10-PCS | Mod: S$GLB,,, | Performed by: PEDIATRICS

## 2023-03-10 PROCEDURE — 3044F HG A1C LEVEL LT 7.0%: CPT | Mod: CPTII,S$GLB,, | Performed by: PEDIATRICS

## 2023-03-10 PROCEDURE — 3044F PR MOST RECENT HEMOGLOBIN A1C LEVEL <7.0%: ICD-10-PCS | Mod: CPTII,S$GLB,, | Performed by: PEDIATRICS

## 2023-03-10 PROCEDURE — 99214 OFFICE O/P EST MOD 30 MIN: CPT | Mod: S$GLB,,, | Performed by: PEDIATRICS

## 2023-03-10 PROCEDURE — 3008F PR BODY MASS INDEX (BMI) DOCUMENTED: ICD-10-PCS | Mod: CPTII,S$GLB,, | Performed by: PEDIATRICS

## 2023-03-10 RX ORDER — DOXYCYCLINE 100 MG/1
100 CAPSULE ORAL EVERY 12 HOURS
Qty: 20 CAPSULE | Refills: 0 | Status: SHIPPED | OUTPATIENT
Start: 2023-03-10 | End: 2023-03-20

## 2023-03-10 NOTE — PROGRESS NOTES
Subjective:       Patient ID: Joon Terry is a 32 y.o. male.    Chief Complaint: No chief complaint on file.    Joon Terry is a 32 y.o. male who presents to clinic to establish care and continue care of cough/fever    cough going on since ~October 2022, Abx has helped, fever has been ~1mo, tylenol and NSAIDS some what help resolve, All subspecialty notes including er reviewed with Pt. No recent weight loss but reports unintentional 50lb weight loss at beginning of 2022. Fever not been measured but associated with sweats. No rashes no joint swelling. No GI complaints. Reports Doxy reduced Sx to some degree.     PMHx, PSHx, SocHx, and FHx reviewed and discussed with patient.       Obesity     Dyslipidemia     Allergic rhinitis     Hypertension     Urticaria, unspecified     Hypogonadism male     Erectile dysfunction    No past surgical history on file.    Review of patient's family history indicates:    Social History    Socioeconomic History      Marital status:     Tobacco Use      Smoking status: Never      Smokeless tobacco: Current        Types: Chew    Substance and Sexual Activity      Alcohol use: Yes        Comment: occassional      Drug use: Never       Review of Systems   Constitutional:  Positive for diaphoresis, fever and unexpected weight change. Negative for activity change, appetite change, chills and fatigue.   HENT:  Negative for nasal congestion, ear pain, mouth sores, nosebleeds, postnasal drip, rhinorrhea, sneezing and sore throat.    Eyes:  Negative for photophobia, pain, discharge, redness and visual disturbance.   Respiratory:  Positive for cough. Negative for chest tightness, shortness of breath, wheezing and stridor.    Cardiovascular:  Negative for chest pain, palpitations and leg swelling.   Gastrointestinal:  Negative for abdominal distention, blood in stool, constipation, diarrhea, nausea and vomiting.   Genitourinary:  Negative for decreased urine volume, difficulty  urinating, dysuria, flank pain, frequency, genital sores, hematuria and urgency.   Musculoskeletal:  Negative for arthralgias, back pain, joint swelling, neck pain and neck stiffness.   Integumentary:  Negative for color change, pallor, rash and wound.   Neurological:  Negative for dizziness, syncope, speech difficulty, weakness, light-headedness and headaches.   Hematological:  Negative for adenopathy. Does not bruise/bleed easily.   Psychiatric/Behavioral:  Negative for confusion, decreased concentration, dysphoric mood, hallucinations, sleep disturbance and suicidal ideas. The patient is not nervous/anxious.    All other systems reviewed and are negative.      Objective:      Physical Exam  Vitals and nursing note reviewed.   Constitutional:       General: He is not in acute distress.     Appearance: He is well-developed.   Neck:      Thyroid: No thyromegaly.      Vascular: No JVD.   Cardiovascular:      Rate and Rhythm: Normal rate and regular rhythm.      Heart sounds: Normal heart sounds. No murmur heard.  Pulmonary:      Effort: Pulmonary effort is normal. No respiratory distress.      Breath sounds: Normal breath sounds. No wheezing or rales.   Abdominal:      General: There is no distension.      Palpations: Abdomen is soft. There is no mass.      Tenderness: There is no abdominal tenderness. There is no guarding.   Musculoskeletal:      Right lower leg: No edema.      Left lower leg: No edema.   Lymphadenopathy:      Cervical: No cervical adenopathy.   Skin:     Capillary Refill: Capillary refill takes less than 2 seconds.      Findings: No rash.   Neurological:      General: No focal deficit present.      Mental Status: He is alert and oriented to person, place, and time.      Cranial Nerves: No cranial nerve deficit.      Coordination: Coordination normal.   Psychiatric:         Mood and Affect: Mood normal.         Behavior: Behavior normal.         Thought Content: Thought content normal.          Judgment: Judgment normal.       Assessment:       Problem List Items Addressed This Visit    None  Visit Diagnoses       Fever, unspecified fever cause    -  Primary    Relevant Orders    HIV 1/2 Ag/Ab (4th Gen)    POCT TB Skin Test Read (Completed)    Quantiferon Gold TB    HEPATITIS C ANTIBODY            Plan:     Fever, unspecified fever cause  -     HIV 1/2 Ag/Ab (4th Gen); Future; Expected date: 03/10/2023  -     POCT TB Skin Test Read  -     Quantiferon Gold TB; Future; Expected date: 03/10/2023  -     HEPATITIS C ANTIBODY; Future; Expected date: 03/10/2023    Other orders  -     doxycycline (MONODOX) 100 MG capsule; Take 1 capsule (100 mg total) by mouth every 12 (twelve) hours. for 10 days  Dispense: 20 capsule; Refill: 0         Pt agrees to HIV testing. No direct risk factors. Complete w/u from Pulm. Test for TB. Extend Doxy as Pt clinically felt that he was better with it. Abx will not affect above work up. Follow up in 6 weeks to reassess or sooner. Note to pulmonary to see if we can move up their w/u and f/u.    Scribe Attestation:   I, Colin Ramirez, am scribing for, and in the presence of, Dr. Giovanni Wu Jr. I performed the above scribed service and the documentation accurately describes the services I performed. I attest to the accuracy of the note.    I, Dr. Giovanni Wu Jr, reviewed documentation as scribed above. I personally performed the services described in this documentation.  I agree that the record reflects my personal performance and is accurate and complete. Giovanni Wu Jr., MD.  03/10/2023

## 2023-03-11 LAB
GAMMA INTERFERON BACKGROUND BLD IA-ACNC: 0.05 IU/ML
HIV 1+2 AB+HIV1 P24 AG SERPL QL IA: REACTIVE
M TB IFN-G CD4+ BCKGRND COR BLD-ACNC: 0.01 IU/ML
MITOGEN IGNF BCKGRD COR BLD-ACNC: 0.83 IU/ML
TB GOLD PLUS: NEGATIVE
TB2 - NIL: -0 IU/ML

## 2023-03-13 ENCOUNTER — OFFICE VISIT (OUTPATIENT)
Dept: INTERNAL MEDICINE | Facility: CLINIC | Age: 33
End: 2023-03-13
Payer: COMMERCIAL

## 2023-03-13 ENCOUNTER — LAB VISIT (OUTPATIENT)
Dept: LAB | Facility: HOSPITAL | Age: 33
End: 2023-03-13
Attending: INTERNAL MEDICINE
Payer: COMMERCIAL

## 2023-03-13 ENCOUNTER — PATIENT MESSAGE (OUTPATIENT)
Dept: INTERNAL MEDICINE | Facility: CLINIC | Age: 33
End: 2023-03-13

## 2023-03-13 ENCOUNTER — TELEPHONE (OUTPATIENT)
Dept: UROLOGY | Facility: CLINIC | Age: 33
End: 2023-03-13
Payer: COMMERCIAL

## 2023-03-13 ENCOUNTER — PATIENT MESSAGE (OUTPATIENT)
Dept: UROLOGY | Facility: CLINIC | Age: 33
End: 2023-03-13
Payer: COMMERCIAL

## 2023-03-13 VITALS
DIASTOLIC BLOOD PRESSURE: 68 MMHG | HEART RATE: 74 BPM | SYSTOLIC BLOOD PRESSURE: 136 MMHG | TEMPERATURE: 102 F | BODY MASS INDEX: 38.06 KG/M2 | HEIGHT: 70 IN | WEIGHT: 265.88 LBS | OXYGEN SATURATION: 98 %

## 2023-03-13 DIAGNOSIS — B20 HIV INFECTION, UNSPECIFIED SYMPTOM STATUS: ICD-10-CM

## 2023-03-13 DIAGNOSIS — B20 SYMPTOMATIC HIV INFECTION: Primary | ICD-10-CM

## 2023-03-13 DIAGNOSIS — B20 HIV INFECTION, UNSPECIFIED SYMPTOM STATUS: Primary | ICD-10-CM

## 2023-03-13 LAB
ACE SERPL-CCNC: 47 U/L (ref 16–85)
AMER SYCAMORE IGE QN: <0.35 KU/L
ANA SER QL IF: NORMAL
BASOPHILS # BLD AUTO: 0.02 K/UL (ref 0–0.2)
BASOPHILS NFR BLD: 0.3 % (ref 0–1.9)
DIFFERENTIAL METHOD: ABNORMAL
EOSINOPHIL # BLD AUTO: 0 K/UL (ref 0–0.5)
EOSINOPHIL NFR BLD: 0 % (ref 0–8)
ERYTHROCYTE [DISTWIDTH] IN BLOOD BY AUTOMATED COUNT: 15.1 % (ref 11.5–14.5)
FEATHER PANEL #2: <0.35 KU/L
HCT VFR BLD AUTO: 39.8 % (ref 40–54)
HGB BLD-MCNC: 12.9 G/DL (ref 14–18)
HIV SUPPLEMENTAL ASSAY INTERPRETATION: ABNORMAL
HIV-1 RESULT: POSITIVE
HIV-2 RESULT: NEGATIVE
IMM GRANULOCYTES # BLD AUTO: 0.12 K/UL (ref 0–0.04)
IMM GRANULOCYTES NFR BLD AUTO: 2 % (ref 0–0.5)
LYMPHOCYTES # BLD AUTO: 0.7 K/UL (ref 1–4.8)
LYMPHOCYTES NFR BLD: 11.9 % (ref 18–48)
MCH RBC QN AUTO: 28.7 PG (ref 27–31)
MCHC RBC AUTO-ENTMCNC: 32.4 G/DL (ref 32–36)
MCV RBC AUTO: 89 FL (ref 82–98)
MONOCYTES # BLD AUTO: 0.5 K/UL (ref 0.3–1)
MONOCYTES NFR BLD: 8.4 % (ref 4–15)
NEUTROPHILS # BLD AUTO: 4.5 K/UL (ref 1.8–7.7)
NEUTROPHILS NFR BLD: 77.4 % (ref 38–73)
NRBC BLD-RTO: 0 /100 WBC
PLATELET # BLD AUTO: 184 K/UL (ref 150–450)
PMV BLD AUTO: 11.3 FL (ref 9.2–12.9)
RBC # BLD AUTO: 4.49 M/UL (ref 4.6–6.2)
TB INDURATION - 48 HR READ: NORMAL
TB INDURATION - 72 HR READ: 0 MM
TB SKIN TEST - 48 HR READ: NORMAL
TB SKIN TEST - 72 HR READ: NEGATIVE
WBC # BLD AUTO: 5.86 K/UL (ref 3.9–12.7)

## 2023-03-13 PROCEDURE — 99214 PR OFFICE/OUTPT VISIT, EST, LEVL IV, 30-39 MIN: ICD-10-PCS | Mod: S$GLB,,, | Performed by: PEDIATRICS

## 2023-03-13 PROCEDURE — 87901 NFCT AGT GNTYP ALYS HIV1 REV: CPT | Performed by: INTERNAL MEDICINE

## 2023-03-13 PROCEDURE — 85025 COMPLETE CBC W/AUTO DIFF WBC: CPT | Performed by: INTERNAL MEDICINE

## 2023-03-13 PROCEDURE — 99999 PR PBB SHADOW E&M-EST. PATIENT-LVL IV: ICD-10-PCS | Mod: PBBFAC,,, | Performed by: PEDIATRICS

## 2023-03-13 PROCEDURE — 86480 TB TEST CELL IMMUN MEASURE: CPT | Performed by: INTERNAL MEDICINE

## 2023-03-13 PROCEDURE — 3078F PR MOST RECENT DIASTOLIC BLOOD PRESSURE < 80 MM HG: ICD-10-PCS | Mod: CPTII,S$GLB,, | Performed by: PEDIATRICS

## 2023-03-13 PROCEDURE — 3078F DIAST BP <80 MM HG: CPT | Mod: CPTII,S$GLB,, | Performed by: PEDIATRICS

## 2023-03-13 PROCEDURE — 80074 ACUTE HEPATITIS PANEL: CPT | Performed by: INTERNAL MEDICINE

## 2023-03-13 PROCEDURE — 1159F MED LIST DOCD IN RCRD: CPT | Mod: CPTII,S$GLB,, | Performed by: PEDIATRICS

## 2023-03-13 PROCEDURE — 3044F PR MOST RECENT HEMOGLOBIN A1C LEVEL <7.0%: ICD-10-PCS | Mod: CPTII,S$GLB,, | Performed by: PEDIATRICS

## 2023-03-13 PROCEDURE — 80053 COMPREHEN METABOLIC PANEL: CPT | Performed by: INTERNAL MEDICINE

## 2023-03-13 PROCEDURE — 99214 OFFICE O/P EST MOD 30 MIN: CPT | Mod: S$GLB,,, | Performed by: PEDIATRICS

## 2023-03-13 PROCEDURE — 1159F PR MEDICATION LIST DOCUMENTED IN MEDICAL RECORD: ICD-10-PCS | Mod: CPTII,S$GLB,, | Performed by: PEDIATRICS

## 2023-03-13 PROCEDURE — 3008F BODY MASS INDEX DOCD: CPT | Mod: CPTII,S$GLB,, | Performed by: PEDIATRICS

## 2023-03-13 PROCEDURE — 1160F PR REVIEW ALL MEDS BY PRESCRIBER/CLIN PHARMACIST DOCUMENTED: ICD-10-PCS | Mod: CPTII,S$GLB,, | Performed by: PEDIATRICS

## 2023-03-13 PROCEDURE — 3075F PR MOST RECENT SYSTOLIC BLOOD PRESS GE 130-139MM HG: ICD-10-PCS | Mod: CPTII,S$GLB,, | Performed by: PEDIATRICS

## 2023-03-13 PROCEDURE — 99999 PR PBB SHADOW E&M-EST. PATIENT-LVL IV: CPT | Mod: PBBFAC,,, | Performed by: PEDIATRICS

## 2023-03-13 PROCEDURE — 1160F RVW MEDS BY RX/DR IN RCRD: CPT | Mod: CPTII,S$GLB,, | Performed by: PEDIATRICS

## 2023-03-13 PROCEDURE — 36415 COLL VENOUS BLD VENIPUNCTURE: CPT | Performed by: INTERNAL MEDICINE

## 2023-03-13 PROCEDURE — 3008F PR BODY MASS INDEX (BMI) DOCUMENTED: ICD-10-PCS | Mod: CPTII,S$GLB,, | Performed by: PEDIATRICS

## 2023-03-13 PROCEDURE — 87536 HIV-1 QUANT&REVRSE TRNSCRPJ: CPT | Performed by: INTERNAL MEDICINE

## 2023-03-13 PROCEDURE — 3044F HG A1C LEVEL LT 7.0%: CPT | Mod: CPTII,S$GLB,, | Performed by: PEDIATRICS

## 2023-03-13 PROCEDURE — 3075F SYST BP GE 130 - 139MM HG: CPT | Mod: CPTII,S$GLB,, | Performed by: PEDIATRICS

## 2023-03-13 PROCEDURE — 86361 T CELL ABSOLUTE COUNT: CPT | Performed by: INTERNAL MEDICINE

## 2023-03-13 NOTE — PROGRESS NOTES
Subjective:       Patient ID: Joon Terry is a 32 y.o. male.    Chief Complaint: Follow-up    Joon Terry is a 32 y.o. male who presents to clinic for follow up of positive HIV test. He denies any obvious risk factors/exposures. , and denies infidelity, M/M sexual, and IV drug use. Has fever and abnornal CXR. Reports weight loss from middle last year and possible viral like Sx in September.     Review of Systems   Constitutional:  Positive for fever and unexpected weight change. Negative for activity change, appetite change, chills, diaphoresis and fatigue.   HENT:  Negative for nasal congestion, ear pain, mouth sores, nosebleeds, postnasal drip, rhinorrhea, sneezing and sore throat.    Eyes:  Negative for photophobia, pain, discharge, redness and visual disturbance.   Respiratory:  Negative for cough, chest tightness, shortness of breath, wheezing and stridor.    Cardiovascular:  Negative for chest pain, palpitations and leg swelling.   Gastrointestinal:  Negative for abdominal distention, blood in stool, constipation, diarrhea, nausea and vomiting.   Genitourinary:  Negative for decreased urine volume, difficulty urinating, dysuria, flank pain, frequency, genital sores, hematuria and urgency.   Musculoskeletal:  Negative for arthralgias, back pain, joint swelling, neck pain and neck stiffness.   Integumentary:  Negative for color change, pallor, rash and wound.   Neurological:  Negative for dizziness, syncope, speech difficulty, weakness, light-headedness and headaches.   Hematological:  Negative for adenopathy. Does not bruise/bleed easily.   Psychiatric/Behavioral:  Negative for confusion, decreased concentration, dysphoric mood, hallucinations, sleep disturbance and suicidal ideas. The patient is not nervous/anxious.    All other systems reviewed and are negative.      Objective:      Physical Exam  Vitals and nursing note reviewed.   Constitutional:       General: He is not in acute  distress.     Appearance: He is well-developed.   Neck:      Thyroid: No thyromegaly.      Vascular: No JVD.   Cardiovascular:      Rate and Rhythm: Normal rate and regular rhythm.      Heart sounds: Normal heart sounds. No murmur heard.  Pulmonary:      Effort: Pulmonary effort is normal. No respiratory distress.      Breath sounds: Normal breath sounds. No wheezing or rales.   Abdominal:      General: There is no distension.      Palpations: Abdomen is soft. There is no mass.      Tenderness: There is no abdominal tenderness. There is no guarding.   Musculoskeletal:      Right lower leg: No edema.      Left lower leg: No edema.   Lymphadenopathy:      Cervical: No cervical adenopathy.   Skin:     Capillary Refill: Capillary refill takes less than 2 seconds.      Findings: No rash.   Neurological:      General: No focal deficit present.      Mental Status: He is alert and oriented to person, place, and time.      Cranial Nerves: No cranial nerve deficit.      Coordination: Coordination normal.   Psychiatric:         Mood and Affect: Mood normal.         Behavior: Behavior normal.         Thought Content: Thought content normal.         Judgment: Judgment normal.       Assessment:       Problem List Items Addressed This Visit    None  Visit Diagnoses       Symptomatic HIV infection    -  Primary    Relevant Orders    Ambulatory referral/consult to Infectious Disease              Plan:     Symptomatic HIV infection  -     Ambulatory referral/consult to Infectious Disease; Future; Expected date: 03/20/2023    Presumed new Dx HIV. I have sent message to ID. Will obtain abs based on ID recommendations to get work up started. Await same for consideration of Abx Tx such as bactrim.      Scribe Attestation:   I, Colin Ramirez, am scribing for, and in the presence of, Dr. Giovanni Wu Jr. I performed the above scribed service and the documentation accurately describes the services I performed. I attest to the accuracy  of the note.    I, Dr. Giovanni Wu Jr, reviewed documentation as scribed above. I personally performed the services described in this documentation.  I agree that the record reflects my personal performance and is accurate and complete. Giovanni Wu Jr., MD.  03/13/2023

## 2023-03-13 NOTE — TELEPHONE ENCOUNTER
Patient notified about Testosterone results.     Loraine Milanes RN     ----- Message from Ranjan Cheatham MD sent at 3/9/2023  6:56 AM CST -----  Testosterone is normal and no indication to replace at this point, will still follow closely

## 2023-03-14 LAB
A ALTERNATA IGE QN: <0.1 KU/L
A FUMIGATUS IGE QN: <0.1 KU/L
ALBUMIN SERPL BCP-MCNC: 3.7 G/DL (ref 3.5–5.2)
ALLERGEN BOXELDER MAPLE TREE IGE: <0.1 KU/L
ALLERGEN MAPLE (BOX ELDER) CLASS: NORMAL
ALLERGEN MULBERRY CLASS: NORMAL
ALLERGEN MULBERRY TREE IGE: <0.1 KU/L
ALLERGEN PIGWEED IGE: <0.1 KU/L
ALP SERPL-CCNC: 68 U/L (ref 55–135)
ALT SERPL W/O P-5'-P-CCNC: 38 U/L (ref 10–44)
ANION GAP SERPL CALC-SCNC: 13 MMOL/L (ref 8–16)
AST SERPL-CCNC: 40 U/L (ref 10–40)
BALD CYPRESS IGE QN: <0.1 KU/L
BERMUDA GRASS IGE QN: <0.1 KU/L
BILIRUB SERPL-MCNC: 0.6 MG/DL (ref 0.1–1)
BUN SERPL-MCNC: 9 MG/DL (ref 6–20)
C HERBARUM IGE QN: <0.1 KU/L
CALCIUM SERPL-MCNC: 9.5 MG/DL (ref 8.7–10.5)
CAT DANDER IGE QN: <0.1 KU/L
CD3+CD4+ CELLS # BLD: 61 CELLS/UL (ref 300–1400)
CD3+CD4+ CELLS NFR BLD: 8.2 % (ref 28–57)
CEDAR IGE QN: <0.1 KU/L
CHLORIDE SERPL-SCNC: 105 MMOL/L (ref 95–110)
CO2 SERPL-SCNC: 25 MMOL/L (ref 23–29)
COCKLEBUR IGE QN: <0.1 KU/L
COMMON PIGWEED CLASS: NORMAL
COMMON RAGWEED IGE QN: <0.1 KU/L
CREAT SERPL-MCNC: 1 MG/DL (ref 0.5–1.4)
D FARINAE IGE QN: <0.1 KU/L
D PTERONYSS IGE QN: <0.1 KU/L
DEPRECATED A ALTERNATA IGE RAST QL: NORMAL
DEPRECATED A FUMIGATUS IGE RAST QL: NORMAL
DEPRECATED BALD CYPRESS IGE RAST QL: NORMAL
DEPRECATED BERMUDA GRASS IGE RAST QL: NORMAL
DEPRECATED C HERBARUM IGE RAST QL: NORMAL
DEPRECATED CAT DANDER IGE RAST QL: NORMAL
DEPRECATED CEDAR IGE RAST QL: NORMAL
DEPRECATED COCKLEBUR IGE RAST QL: NORMAL
DEPRECATED COMMON RAGWEED IGE RAST QL: NORMAL
DEPRECATED D FARINAE IGE RAST QL: NORMAL
DEPRECATED D PTERONYSS IGE RAST QL: NORMAL
DEPRECATED DOG DANDER IGE RAST QL: NORMAL
DEPRECATED ENGL PLANTAIN IGE RAST QL: NORMAL
DEPRECATED GOOSEFOOT IGE RAST QL: NORMAL
DEPRECATED M RACEMOSUS IGE RAST QL: NORMAL
DEPRECATED MUGWORT IGE RAST QL: NORMAL
DEPRECATED NETTLE IGE RAST QL: NORMAL
DEPRECATED P NOTATUM IGE RAST QL: NORMAL
DEPRECATED PECAN/HICK TREE IGE RAST QL: NORMAL
DEPRECATED ROACH IGE RAST QL: ABNORMAL
DEPRECATED SHEEP SORREL IGE RAST QL: NORMAL
DEPRECATED SILVER BIRCH IGE RAST QL: NORMAL
DEPRECATED TIMOTHY IGE RAST QL: NORMAL
DEPRECATED WHITE OAK IGE RAST QL: NORMAL
DOG DANDER IGE QN: <0.1 KU/L
ELM CEDAR CLASS: NORMAL
ELM CEDAR, IGE: <0.1 KU/L
ENGL PLANTAIN IGE QN: <0.1 KU/L
EST. GFR  (NO RACE VARIABLE): >60 ML/MIN/1.73 M^2
GLUCOSE SERPL-MCNC: 98 MG/DL (ref 70–110)
GOOSEFOOT IGE QN: <0.1 KU/L
HAV IGM SERPL QL IA: NORMAL
HBV CORE IGM SERPL QL IA: NORMAL
HBV SURFACE AG SERPL QL IA: NORMAL
HCV AB SERPL QL IA: NORMAL
HIV1 RNA # SERPL NAA+PROBE: ABNORMAL COPIES/ML
HIV1 RNA SERPL NAA+PROBE-LOG#: 5.73 LOG COPIES/ML
HIV1 RNA SERPL QL NAA+PROBE: DETECTED
M RACEMOSUS IGE QN: <0.1 KU/L
MUGWORT IGE QN: <0.1 KU/L
NETTLE IGE QN: <0.1 KU/L
P NOTATUM IGE QN: <0.1 KU/L
PECAN/HICK TREE IGE QN: <0.1 KU/L
POTASSIUM SERPL-SCNC: 3.6 MMOL/L (ref 3.5–5.1)
PROT SERPL-MCNC: 7.9 G/DL (ref 6–8.4)
ROACH IGE QN: 0.16 KU/L
SHEEP SORREL IGE QN: <0.1 KU/L
SILVER BIRCH IGE QN: <0.1 KU/L
SODIUM SERPL-SCNC: 143 MMOL/L (ref 136–145)
TIMOTHY IGE QN: <0.1 KU/L
WHITE OAK IGE QN: <0.1 KU/L

## 2023-03-15 ENCOUNTER — TELEPHONE (OUTPATIENT)
Dept: INFECTIOUS DISEASES | Facility: CLINIC | Age: 33
End: 2023-03-15
Payer: COMMERCIAL

## 2023-03-15 LAB
ALLERGEN WHITE ASH TREE IGE: <0.1 KU/L
DEPRECATED ELDER IGE RAST QL: NORMAL
DEPRECATED JOHNSON GRASS IGE RAST QL: NORMAL
DEPRECATED KENT BLUE GRASS IGE RAST QL: NORMAL
ELDER IGE QN: <0.1 KU/L
GAMMA INTERFERON BACKGROUND BLD IA-ACNC: 0.03 IU/ML
JOHNSON GRASS IGE QN: <0.1 KU/L
KENT BLUE GRASS IGE QN: <0.1 KU/L
M TB IFN-G CD4+ BCKGRND COR BLD-ACNC: -0 IU/ML
MITOGEN IGNF BCKGRD COR BLD-ACNC: 4.09 IU/ML
TB GOLD PLUS: NEGATIVE
TB2 - NIL: 0 IU/ML
WHITE ASH CLASS: NORMAL

## 2023-03-15 NOTE — TELEPHONE ENCOUNTER
----- Message from Damaris Campuzano sent at 3/15/2023  8:44 AM CDT -----  Contact: pt's wife/Preeti  Type:  Test Results    Who Called:  Preeti  Name of Test (Lab/Mammo/Etc):  lab  Date of Test: 3/13  Ordering Provider: sonali  Where the [facility] was performed[facility]  Would the patient rather a call back or a response via [facility]?  phone  Best Call Back Number: [phone]  Additional Information:

## 2023-03-22 ENCOUNTER — TELEPHONE (OUTPATIENT)
Dept: INFECTIOUS DISEASES | Facility: CLINIC | Age: 33
End: 2023-03-22
Payer: COMMERCIAL

## 2023-03-22 RX ORDER — SULFAMETHOXAZOLE AND TRIMETHOPRIM 800; 160 MG/1; MG/1
1 TABLET ORAL DAILY
Qty: 30 TABLET | Refills: 4 | Status: SHIPPED | OUTPATIENT
Start: 2023-03-22 | End: 2023-04-21

## 2023-03-22 NOTE — TELEPHONE ENCOUNTER
----- Message from Joesph Taylor MD, FIDSA sent at 3/22/2023  9:15 AM CDT -----  He has AIDS- will need to start Bactrim now before the clinic visit -will send to the pharmacy

## 2023-03-25 LAB — HIV GENTYP ISLT: DETECTED

## 2023-04-18 ENCOUNTER — TELEPHONE (OUTPATIENT)
Dept: INFECTIOUS DISEASES | Facility: CLINIC | Age: 33
End: 2023-04-18
Payer: COMMERCIAL

## 2023-04-18 NOTE — TELEPHONE ENCOUNTER
----- Message from Joesph Taylor MD, ALYSON sent at 4/18/2023 10:50 AM CDT -----  Did he go elsewhere ?  We need to see in clinic   ----- Message -----  From: [facility]  Sent: 3/13/2023   4:01 PM CDT  To: Joesph Taylor MD, ALYSON

## 2024-05-15 DIAGNOSIS — I10 HYPERTENSION: ICD-10-CM

## 2025-01-31 ENCOUNTER — PATIENT OUTREACH (OUTPATIENT)
Dept: ADMINISTRATIVE | Facility: HOSPITAL | Age: 35
End: 2025-01-31
Payer: COMMERCIAL

## 2025-03-06 ENCOUNTER — PATIENT MESSAGE (OUTPATIENT)
Dept: ADMINISTRATIVE | Facility: HOSPITAL | Age: 35
End: 2025-03-06
Payer: COMMERCIAL

## 2025-03-17 ENCOUNTER — PATIENT OUTREACH (OUTPATIENT)
Dept: ADMINISTRATIVE | Facility: HOSPITAL | Age: 35
End: 2025-03-17
Payer: COMMERCIAL